# Patient Record
Sex: FEMALE | Race: WHITE | NOT HISPANIC OR LATINO | URBAN - METROPOLITAN AREA
[De-identification: names, ages, dates, MRNs, and addresses within clinical notes are randomized per-mention and may not be internally consistent; named-entity substitution may affect disease eponyms.]

---

## 2024-02-07 ENCOUNTER — INITIAL PRENATAL (OUTPATIENT)
Dept: OBSTETRICS AND GYNECOLOGY | Facility: CLINIC | Age: 34
End: 2024-02-07
Payer: COMMERCIAL

## 2024-02-07 VITALS — BODY MASS INDEX: 25.23 KG/M2 | WEIGHT: 147 LBS | SYSTOLIC BLOOD PRESSURE: 118 MMHG | DIASTOLIC BLOOD PRESSURE: 60 MMHG

## 2024-02-07 DIAGNOSIS — Z34.81 NORMAL PREGNANCY IN MULTIGRAVIDA IN FIRST TRIMESTER (HHS-HCC): ICD-10-CM

## 2024-02-07 DIAGNOSIS — Z98.891 HISTORY OF PRIMARY CESAREAN SECTION: ICD-10-CM

## 2024-02-07 DIAGNOSIS — Z34.91 FIRST TRIMESTER PREGNANCY (HHS-HCC): Primary | ICD-10-CM

## 2024-02-07 LAB — PREGNANCY TEST URINE, POC: POSITIVE

## 2024-02-07 PROCEDURE — 87086 URINE CULTURE/COLONY COUNT: CPT

## 2024-02-07 PROCEDURE — 87800 DETECT AGNT MULT DNA DIREC: CPT

## 2024-02-07 PROCEDURE — 81025 URINE PREGNANCY TEST: CPT | Performed by: ADVANCED PRACTICE MIDWIFE

## 2024-02-07 PROCEDURE — 87661 TRICHOMONAS VAGINALIS AMPLIF: CPT

## 2024-02-07 PROCEDURE — 0500F INITIAL PRENATAL CARE VISIT: CPT | Performed by: ADVANCED PRACTICE MIDWIFE

## 2024-02-07 ASSESSMENT — ENCOUNTER SYMPTOMS
MUSCULOSKELETAL NEGATIVE: 0
PSYCHIATRIC NEGATIVE: 0
RESPIRATORY NEGATIVE: 0
NEUROLOGICAL NEGATIVE: 0
EYES NEGATIVE: 0
CARDIOVASCULAR NEGATIVE: 0
ABDOMINAL PAIN: 1
ALLERGIC/IMMUNOLOGIC NEGATIVE: 0
UNEXPECTED WEIGHT CHANGE: 1
CONSTITUTIONAL NEGATIVE: 1
NAUSEA: 1
HEMATOLOGIC/LYMPHATIC NEGATIVE: 0
FATIGUE: 1
GASTROINTESTINAL NEGATIVE: 1
ENDOCRINE NEGATIVE: 0

## 2024-02-07 ASSESSMENT — EDINBURGH POSTNATAL DEPRESSION SCALE (EPDS)
I HAVE BLAMED MYSELF UNNECESSARILY WHEN THINGS WENT WRONG: NO, NEVER
I HAVE BEEN SO UNHAPPY THAT I HAVE BEEN CRYING: NO, NEVER
I HAVE FELT SCARED OR PANICKY FOR NO GOOD REASON: NO, NOT AT ALL
THE THOUGHT OF HARMING MYSELF HAS OCCURRED TO ME: NEVER
I HAVE LOOKED FORWARD WITH ENJOYMENT TO THINGS: AS MUCH AS I EVER DID
I HAVE BEEN ANXIOUS OR WORRIED FOR NO GOOD REASON: YES, SOMETIMES
I HAVE BEEN ABLE TO LAUGH AND SEE THE FUNNY SIDE OF THINGS: AS MUCH AS I ALWAYS COULD
TOTAL SCORE: 3
I HAVE FELT SAD OR MISERABLE: NO, NOT AT ALL
I HAVE BEEN SO UNHAPPY THAT I HAVE HAD DIFFICULTY SLEEPING: NOT AT ALL
THINGS HAVE BEEN GETTING ON TOP OF ME: NO, MOST OF THE TIME I HAVE COPED QUITE WELL

## 2024-02-07 NOTE — PROGRESS NOTES
"Patient presents for NOB visit with alone.     This was an planned pregnancy, and patient is feeling \"great\"  Complaints: Poss stomach ache    Work: Administrative work -remote   Feels safe at home: Yes    Review of Systems   Constitutional:  Positive for fatigue and unexpected weight change.   Gastrointestinal:  Positive for abdominal pain and nausea.   Genitourinary:  Positive for vaginal discharge.   All other systems reviewed and are negative.      1. Routine PNC, patient appropriate for midwifery service. Patient oriented to practice, including available collaboration and consulting with physicians.   Prenatal navigator findings reviewed.  Discussed and ordered routine OB labs including serum STI/HIV, CBC and blood type and screen.   Pap  MILN/HPV neg Denies hx abnormal  LMP: 23.  Education provided r/t nutrition, folic acid supplementation, dietary guidelines, exercise, smoking, alcohol, caffeine and drug use. Healthy weight gain in pregnancy discussed.  Body mass index is 25.23 kg/m².    2. Pregnancy Hx  Reviewed and significant for: Hx  due to arrest of descent desires TOLAC MFMU score 54.4%    3. Genetic screening:  Genetic carrier screening discussed/offered and patient declined. Reports completed.  Chromosomal anomaly screening discussed/offered and patient declined.   Included in counseling that only diagnostic testing is CVS or amniocentesis.    4. Preeclampsia risk:  ASA prophylaxis: not candidate    6. Vaccination in early pregnancy:  Recommendation for Influenza and COVID vaccine discussed. Patient aware of increased risk of disease and demonstrated safety of vaccination during pregnancy.  Pt declined flu vaccine.  Pt accepted COVID vaccine, no boosters and does not plan.    7. Medical History Significant for:  negative    Warning s/s discussed and SAB precautions reviewed.   RTC 4wks/prn    Next visit review ultrasound and labs. Physical exam (no PAP).  "

## 2024-02-08 ENCOUNTER — HOSPITAL ENCOUNTER (OUTPATIENT)
Dept: RADIOLOGY | Facility: CLINIC | Age: 34
Discharge: HOME | End: 2024-02-08
Payer: COMMERCIAL

## 2024-02-08 DIAGNOSIS — Z34.91 FIRST TRIMESTER PREGNANCY (HHS-HCC): ICD-10-CM

## 2024-02-08 LAB
BACTERIA UR CULT: NO GROWTH
C TRACH RRNA SPEC QL NAA+PROBE: NEGATIVE
N GONORRHOEA DNA SPEC QL PROBE+SIG AMP: NEGATIVE
T VAGINALIS RRNA SPEC QL NAA+PROBE: NEGATIVE

## 2024-02-08 PROCEDURE — 76801 OB US < 14 WKS SINGLE FETUS: CPT

## 2024-02-08 PROCEDURE — 76801 OB US < 14 WKS SINGLE FETUS: CPT | Performed by: OBSTETRICS & GYNECOLOGY

## 2024-02-21 ENCOUNTER — HOSPITAL ENCOUNTER (OUTPATIENT)
Dept: RADIOLOGY | Facility: CLINIC | Age: 34
Discharge: HOME | End: 2024-02-21
Payer: COMMERCIAL

## 2024-02-21 DIAGNOSIS — O20.9 VAGINAL BLEEDING AFFECTING EARLY PREGNANCY (HHS-HCC): Primary | ICD-10-CM

## 2024-02-21 DIAGNOSIS — Z34.91 FIRST TRIMESTER PREGNANCY (HHS-HCC): ICD-10-CM

## 2024-02-21 DIAGNOSIS — O26.851 SPOTTING COMPLICATING PREGNANCY, FIRST TRIMESTER (HHS-HCC): ICD-10-CM

## 2024-02-21 PROCEDURE — 76816 OB US FOLLOW-UP PER FETUS: CPT

## 2024-02-21 PROCEDURE — 76816 OB US FOLLOW-UP PER FETUS: CPT | Performed by: OBSTETRICS & GYNECOLOGY

## 2024-02-29 ENCOUNTER — TELEPHONE (OUTPATIENT)
Dept: OBSTETRICS AND GYNECOLOGY | Facility: CLINIC | Age: 34
End: 2024-02-29
Payer: COMMERCIAL

## 2024-02-29 NOTE — TELEPHONE ENCOUNTER
Patient called in wanting to know what the codes they will be charged for are for their upcoming visit on 3/6. Please advise.

## 2024-03-01 NOTE — TELEPHONE ENCOUNTER
Patient coming in on 3/6/24.   Would like to know what billing code will be used to charge her visit?    Sydnee Miles MA

## 2024-03-06 ENCOUNTER — ROUTINE PRENATAL (OUTPATIENT)
Dept: OBSTETRICS AND GYNECOLOGY | Facility: CLINIC | Age: 34
End: 2024-03-06
Payer: COMMERCIAL

## 2024-03-06 ENCOUNTER — TELEPHONE (OUTPATIENT)
Dept: OBSTETRICS AND GYNECOLOGY | Facility: CLINIC | Age: 34
End: 2024-03-06

## 2024-03-06 VITALS — BODY MASS INDEX: 25.47 KG/M2 | DIASTOLIC BLOOD PRESSURE: 60 MMHG | SYSTOLIC BLOOD PRESSURE: 100 MMHG | WEIGHT: 148.4 LBS

## 2024-03-06 DIAGNOSIS — Z34.82 NORMAL PREGNANCY IN MULTIGRAVIDA IN SECOND TRIMESTER (HHS-HCC): ICD-10-CM

## 2024-03-06 DIAGNOSIS — Z3A.14 14 WEEKS GESTATION OF PREGNANCY (HHS-HCC): Primary | ICD-10-CM

## 2024-03-06 PROCEDURE — 0501F PRENATAL FLOW SHEET: CPT | Performed by: ADVANCED PRACTICE MIDWIFE

## 2024-03-06 NOTE — PROGRESS NOTES
Return OB visit    S: Talia Talavera is a 34 y.o.  at 14w0d with a working estimated date of delivery of 2024, by Last Menstrual Period who presents for a routine prenatal visit. She denies vaginal bleeding, abdominal pain, leakage of fluid.     Concerns today: seen in ER for bleeding, resolved    O: See prenatal flow sheet    A/P:  Dating by LMP c/w 10w ultrasound  Physical completed today  Reviewed warning signs and when to call midwife  Follow up in 4 weeks for a routine prenatal visit--consult with physician for TOLAC

## 2024-03-06 NOTE — TELEPHONE ENCOUNTER
Patient wanted to know if they could be sent the codes from their lab work that was requested. Patient also was wondering if they could get the codes for their anatomy scan as well if possible.

## 2024-03-08 NOTE — TELEPHONE ENCOUNTER
Spoke to patient   Patient driving   Informed will send Codes on incuBET   Message sent  Patient will call back if anything else is needed.    Sydnee Miles MA

## 2024-03-13 DIAGNOSIS — Z34.82 NORMAL PREGNANCY IN MULTIGRAVIDA IN SECOND TRIMESTER (HHS-HCC): Primary | ICD-10-CM

## 2024-04-01 LAB
EXTERNAL ABO GROUPING: NORMAL
EXTERNAL ANTIBODY SCREEN: NORMAL
EXTERNAL HEPATITIS B SURFACE ANTIGEN: NEGATIVE
EXTERNAL RH FACTOR: POSITIVE
EXTERNAL RUBELLA IGG QUANTITATION: NOT DETECTED
HEPATITIS C VIRUS AB PRESENCE IN SERUM EXTERNAL: NONREACTIVE
HIV 1/ 2 AG/AB SCREEN EXTERNAL: NON REACTIVE
SYPHILIS TOTAL AB EXTERNAL: NON REACTIVE

## 2024-04-09 ENCOUNTER — APPOINTMENT (OUTPATIENT)
Dept: RADIOLOGY | Facility: CLINIC | Age: 34
End: 2024-04-09
Payer: COMMERCIAL

## 2024-04-09 ENCOUNTER — APPOINTMENT (OUTPATIENT)
Dept: OBSTETRICS AND GYNECOLOGY | Facility: CLINIC | Age: 34
End: 2024-04-09
Payer: COMMERCIAL

## 2024-04-09 ENCOUNTER — TELEPHONE (OUTPATIENT)
Dept: OBSTETRICS AND GYNECOLOGY | Facility: CLINIC | Age: 34
End: 2024-04-09

## 2024-04-09 ENCOUNTER — HOSPITAL ENCOUNTER (OUTPATIENT)
Dept: RADIOLOGY | Facility: CLINIC | Age: 34
Discharge: HOME | End: 2024-04-09
Payer: COMMERCIAL

## 2024-04-09 ENCOUNTER — ROUTINE PRENATAL (OUTPATIENT)
Dept: OBSTETRICS AND GYNECOLOGY | Facility: CLINIC | Age: 34
End: 2024-04-09
Payer: COMMERCIAL

## 2024-04-09 VITALS
SYSTOLIC BLOOD PRESSURE: 112 MMHG | BODY MASS INDEX: 26.16 KG/M2 | WEIGHT: 152.38 LBS | DIASTOLIC BLOOD PRESSURE: 72 MMHG

## 2024-04-09 DIAGNOSIS — Z3A.18 18 WEEKS GESTATION OF PREGNANCY (HHS-HCC): Primary | ICD-10-CM

## 2024-04-09 DIAGNOSIS — Z34.81 NORMAL PREGNANCY IN MULTIGRAVIDA IN FIRST TRIMESTER (HHS-HCC): ICD-10-CM

## 2024-04-09 DIAGNOSIS — O36.8390 FETAL ARRHYTHMIA AFFECTING PREGNANCY, ANTEPARTUM (HHS-HCC): ICD-10-CM

## 2024-04-09 PROCEDURE — 76811 OB US DETAILED SNGL FETUS: CPT | Performed by: OBSTETRICS & GYNECOLOGY

## 2024-04-09 PROCEDURE — 0501F PRENATAL FLOW SHEET: CPT | Performed by: OBSTETRICS & GYNECOLOGY

## 2024-04-09 PROCEDURE — 76811 OB US DETAILED SNGL FETUS: CPT

## 2024-04-09 NOTE — PROGRESS NOTES
Physician visit for TOLAC counseling.  Patient had a  section in [place] 2022 for arrest of descent, baby was 7 lb 13 oz.  Labored spontaneously at 39+ wks, baby was OP, two physicians tried to rotate it.  Pushed for 6 hours, then turned into an urgent section, baby spent 4 days in NICU (not intubated). MFMU score 54.4%.  We do not have her operative report in her chart, a release was signed today.  The patient strongly desires a TOLAC.  We discussed that she has a recurring indication for a  section and that I would recommend a repeat  section given arrest of descent and time pushing.  She again indicated a strong desire for TOLAC, states she would not push for that long again.  We did not sign TOLAC consent form today.  Plan to get her operative report and see me back in her early third trimester to re-discuss and sign consent and readdress delivery plan.  On FHTs today, there is a clear fetal arrhythmia noted (?PACs).  Scheduled for add on MFM ultrasound today at 3 pm for this.

## 2024-04-10 ENCOUNTER — TELEPHONE (OUTPATIENT)
Dept: OBSTETRICS AND GYNECOLOGY | Facility: HOSPITAL | Age: 34
End: 2024-04-10
Payer: COMMERCIAL

## 2024-04-15 ENCOUNTER — HOSPITAL ENCOUNTER (OUTPATIENT)
Dept: RADIOLOGY | Facility: CLINIC | Age: 34
Discharge: HOME | End: 2024-04-15
Payer: COMMERCIAL

## 2024-04-15 DIAGNOSIS — Z34.91 FIRST TRIMESTER PREGNANCY (HHS-HCC): ICD-10-CM

## 2024-04-15 DIAGNOSIS — O36.8390 FETAL ARRHYTHMIA AFFECTING PREGNANCY, ANTEPARTUM (HHS-HCC): ICD-10-CM

## 2024-04-15 DIAGNOSIS — O35.BXX0 ABNORMAL FETAL ECHOCARDIOGRAPHY AFFECTING ANTEPARTUM CARE OF MOTHER, SINGLE OR UNSPECIFIED FETUS (HHS-HCC): Primary | ICD-10-CM

## 2024-04-15 PROCEDURE — 76815 OB US LIMITED FETUS(S): CPT | Performed by: OBSTETRICS & GYNECOLOGY

## 2024-04-15 PROCEDURE — 76815 OB US LIMITED FETUS(S): CPT

## 2024-04-16 ENCOUNTER — HOSPITAL ENCOUNTER (OUTPATIENT)
Dept: PEDIATRIC CARDIOLOGY | Facility: HOSPITAL | Age: 34
Discharge: HOME | End: 2024-04-16
Payer: COMMERCIAL

## 2024-04-16 ENCOUNTER — OFFICE VISIT (OUTPATIENT)
Dept: PEDIATRIC CARDIOLOGY | Facility: HOSPITAL | Age: 34
End: 2024-04-16
Payer: COMMERCIAL

## 2024-04-16 VITALS
DIASTOLIC BLOOD PRESSURE: 64 MMHG | SYSTOLIC BLOOD PRESSURE: 110 MMHG | BODY MASS INDEX: 26.38 KG/M2 | HEART RATE: 99 BPM | HEIGHT: 64 IN | WEIGHT: 154.54 LBS

## 2024-04-16 DIAGNOSIS — O35.BXX0 ABNORMAL FETAL ECHOCARDIOGRAPHY AFFECTING ANTEPARTUM CARE OF MOTHER, SINGLE OR UNSPECIFIED FETUS (HHS-HCC): ICD-10-CM

## 2024-04-16 DIAGNOSIS — O36.8390 FETAL ARRHYTHMIA AFFECTING PREGNANCY, ANTEPARTUM (HHS-HCC): Primary | ICD-10-CM

## 2024-04-16 DIAGNOSIS — O35.8XX0 MATERNAL CARE FOR OTHER (SUSPECTED) FETAL ABNORMALITY AND DAMAGE, NOT APPLICABLE OR UNSPECIFIED (HHS-HCC): ICD-10-CM

## 2024-04-16 PROCEDURE — 99205 OFFICE O/P NEW HI 60 MIN: CPT | Performed by: PEDIATRICS

## 2024-04-16 PROCEDURE — 99215 OFFICE O/P EST HI 40 MIN: CPT | Performed by: PEDIATRICS

## 2024-04-16 PROCEDURE — 93325 DOPPLER ECHO COLOR FLOW MAPG: CPT

## 2024-04-16 PROCEDURE — 76827 ECHO EXAM OF FETAL HEART: CPT | Performed by: PEDIATRICS

## 2024-04-16 PROCEDURE — 93325 DOPPLER ECHO COLOR FLOW MAPG: CPT | Performed by: PEDIATRICS

## 2024-04-16 NOTE — PROGRESS NOTES
"Talia Talavera was seen at the request of Lester Duncan for a chief complaint of persistent PAC's; a report with my findings is being sent via written or electronic means the referring physician with my recommendations for treatment.     I had the pleasure of seeing Talia Talavera in Pediatric Cardiology consultation at our [facility] location as part of our prenatal heart program for PAC's that were first found on obstetric ultrasound on 24. Ms. Talavera had a follow up ultrasound on 4/15/24 that showed persistent PAC's and \"FHR had a few beats that were less than 110.\"  She is a 34 y.o. year-old  woman, currently 19w6d weeks gestation. Patient's last menstrual period was 2023..  Estimated Date of Delivery: 24.  There have been no pregnancy complications.   She has not been hospitalized during this pregnancy.  She declined aneuploidy testing.  She had a second trimester ultrasound which showed PAC's and brief episodes of FHR <110..      Prior to the visit, I personally reviewed the cardiac portions of the obstetrical ultrasound performed on 4/15/24.  There is normal segmental anatomy with normal 4 chamber, outflow tract and 3 vessel views.  There is no evidence of septation defect, right or left ventricular outflow obstruction or significant valvular regurgitation.  There were multiple episodes of ectopy with premature atrial contractions.  Some 2D images are suggestive of couplets.    Her previous obstetrical history is significant for 1 full term delivery.  Her past medical history is without complication.  She has no history of congenital heart disease, arrhythmia, cardiomyopathy, hypercholesterolemia, hypertension, diabetes, rheumatic heart disease, cancer, asthma, lupus, Sjogren syndrome, clotting disorder, depression, anxiety, alcohol abuse, phenylketonuria, or DiGeorge.  She has had a .  She takes vitamin D.  She has No Known Allergies..  She is currently taking prenatal " "vitamins.      Her family history is negative for congenital heart disease, early atherosclerosis, sudden cardiac death, long QT syndrome, cardiomyopathy, aortic aneurysm, or genetic or metabolic disease.      She currently lives with her spouse and is .  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  She denies illicit drug use or alcohol abuse.  She denies verbal, sexual, or physical abuse.     Delivery Hospital: [facility]  Father of the baby's name: Rex    /64 (BP Location: Right arm, Patient Position: Sitting, BP Cuff Size: Adult)   Pulse 99   Ht 1.614 m (5' 3.54\")   Wt 70.1 kg (154 lb 8.7 oz)   LMP 11/29/2023   BMI 26.91 kg/m²     She was resting comfortably in the examination room and alert, active and in no respiratory distress. Skin was without rash.  HEENT: moist mucous membranes, no JVD, goiter. Breathing is not labored.  She was acyanotic.  There was no peripheral edema.   The abdomen was gravid, soft, nontender with normal bowel sounds.  The liver was not palpable.  The spleen tip was not palpable.  She had a normal gait and normal strength in all extremities.  Cranial nerves II - XII are intact.  She had no clubbing, cyanosis, or edema.    A two-dimensional and Doppler fetal echocardiogram was performed today and interpreted by me at 19w6d weeks gestation.  The fetal echocardiogram showed normal segmental anatomy with no structural abnormalities found.  There is normal cardiac function.  There is no evidence of septation defect, right or left ventricular outflow obstruction or significant valvular regurgitation.  The fetal heart rate was within normal limits with frequent premature atrial contractions, two couplets and one possible triplet.  The spectral Doppler pattern across all valves, venous structures, and arterial structures was within normal limits.  There is no pericardial effusion.  Please see full report for details.    In summary, Talia Talavera is a 34 " y.o. year-old  woman, currently 19w6d weeks gestation, who had a fetal echocardiogram that demonstrated frequent premature atrial contractions (PACs), two atrial couplets and one possible triplet.  Today's echocardiogram was reassuring in that there were no episodes of sustained ectopy or tachycardia; no sustained bradycardia. There was no evidence of ventricular or atrial dilatation and no effusions noted. Premature atrial beats are common findings (1-3% of pregnancies) and generally well tolerated unless sustained tachycardia develops. No intervention recommended at this time. Risk of fetal tachycardia is about 0.5% to 1%, although couplets and blocked atrial bigeminy may increase this risk. Because of the couplets, I did recommend a follow up scan in 1 week.  Medical treatment is not recommended for either premature atrial contractions or blocked atrial bigeminy; however, interval auscultation of the fetal heart rate by the obstetrician weekly is recommended for premature ventricular contractions or frequent premature atrial contractions until resolution of the arrhythmia is documented.   If ectopy noted after delivery, recommend ECG on the .  At this time, we did not make any changes to delivery plan.      LOC 1  This cardiac anomaly is not expected to cause hemodynamic instability in the  period. No changes were made to current delivery plan.  Triage code 1: Delivery per OB at patient's preferred hospital.  Standard  care per  team.  Cardiology evaluation prior to discharge if born at [facility] or as an outpatient within 1-2 weeks if born at an outside facility.      Thank you for allowing me to participate in Talia's care.  If you have any further questions, please do not hesitate to contact me.     Baron Abreu M.D.  [facility], Director  Ambulatory Pediatric Cardiology   Division of Pediatric Cardiology  [facility]  [facility]   of Pediatrics, [facility] [employer]  [facility] -   [address]  [place] [id number]  Office:  [phone]  Fax:       [phone]  e-mail:  [email or web address]    I spent greater than 60 minutes in performance of this consultation, of which greater than 50% was related to coordination of care or counseling.

## 2024-04-16 NOTE — LETTER
"2024     Lester Duncan MD  [address]  Ob/Gyn  [facility] [address]    Patient: Talia Talavera   YOB: 1990   Date of Visit: 2024       Dear Dr. Lester Duncan MD:    Thank you for referring Talia Talavera to me for evaluation. Below are my notes for this consultation.  If you have questions, please do not hesitate to call me. I look forward to following your patient along with you.       Sincerely,     Baron Abreu MD      CC: MD Amy Umanzor, APRN-CN  Alesia Khan, APRN-CN, RASHID Barbosa, APRN-CNP  ______________________________________________________________________________________    Talia Talavera was seen at the request of Lester Duncan for a chief complaint of persistent PAC's; a report with my findings is being sent via written or electronic means the referring physician with my recommendations for treatment.     I had the pleasure of seeing Talia Talavera in Pediatric Cardiology consultation at our [facility] location as part of our prenatal heart program for PAC's that were first found on obstetric ultrasound on 24. Ms. Talavera had a follow up ultrasound on 4/15/24 that showed persistent PAC's and \"FHR had a few beats that were less than 110.\"  She is a 34 y.o. year-old  woman, currently 19w6d weeks gestation. Patient's last menstrual period was 2023..  Estimated Date of Delivery: 24.  There have been no pregnancy complications.   She has not been hospitalized during this pregnancy.  She declined aneuploidy testing.  She had a second trimester ultrasound which showed PAC's and brief episodes of FHR <110..      Prior to the visit, I personally reviewed the cardiac portions of the obstetrical ultrasound performed on 4/15/24.  There is normal segmental anatomy with normal 4 chamber, outflow tract and 3 vessel views.  There is no evidence of septation defect, right or left ventricular outflow obstruction or " "significant valvular regurgitation.  There were multiple episodes of ectopy with premature atrial contractions.  Some 2D images are suggestive of couplets.    Her previous obstetrical history is significant for 1 full term delivery.  Her past medical history is without complication.  She has no history of congenital heart disease, arrhythmia, cardiomyopathy, hypercholesterolemia, hypertension, diabetes, rheumatic heart disease, cancer, asthma, lupus, Sjogren syndrome, clotting disorder, depression, anxiety, alcohol abuse, phenylketonuria, or DiGeorge.  She has had a .  She takes vitamin D.  She has No Known Allergies..  She is currently taking prenatal vitamins.      Her family history is negative for congenital heart disease, early atherosclerosis, sudden cardiac death, long QT syndrome, cardiomyopathy, aortic aneurysm, or genetic or metabolic disease.      She currently lives with her spouse and is .  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  She denies illicit drug use or alcohol abuse.  She denies verbal, sexual, or physical abuse.     Delivery Hospital: [facility]  Father of the baby's name: Rex    /64 (BP Location: Right arm, Patient Position: Sitting, BP Cuff Size: Adult)   Pulse 99   Ht 1.614 m (5' 3.54\")   Wt 70.1 kg (154 lb 8.7 oz)   LMP 2023   BMI 26.91 kg/m²     She was resting comfortably in the examination room and alert, active and in no respiratory distress. Skin was without rash.  HEENT: moist mucous membranes, no JVD, goiter. Breathing is not labored.  She was acyanotic.  There was no peripheral edema.   The abdomen was gravid, soft, nontender with normal bowel sounds.  The liver was not palpable.  The spleen tip was not palpable.  She had a normal gait and normal strength in all extremities.  Cranial nerves II - XII are intact.  She had no clubbing, cyanosis, or edema.    A two-dimensional and Doppler fetal echocardiogram was performed today " and interpreted by me at 19w6d weeks gestation.  The fetal echocardiogram showed normal segmental anatomy with no structural abnormalities found.  There is normal cardiac function.  There is no evidence of septation defect, right or left ventricular outflow obstruction or significant valvular regurgitation.  The fetal heart rate was within normal limits with frequent premature atrial contractions, two couplets and one possible triplet.  The spectral Doppler pattern across all valves, venous structures, and arterial structures was within normal limits.  There is no pericardial effusion.  Please see full report for details.    In summary, Talia Talavera is a 34 y.o. year-old  woman, currently 19w6d weeks gestation, who had a fetal echocardiogram that demonstrated frequent premature atrial contractions (PACs), two atrial couplets and one possible triplet.  Today's echocardiogram was reassuring in that there were no episodes of sustained ectopy or tachycardia; no sustained bradycardia. There was no evidence of ventricular or atrial dilatation and no effusions noted. Premature atrial beats are common findings (1-3% of pregnancies) and generally well tolerated unless sustained tachycardia develops. No intervention recommended at this time. Risk of fetal tachycardia is about 0.5% to 1%, although couplets and blocked atrial bigeminy may increase this risk. Because of the couplets, I did recommend a follow up scan in 1 week.  Medical treatment is not recommended for either premature atrial contractions or blocked atrial bigeminy; however, interval auscultation of the fetal heart rate by the obstetrician weekly is recommended for premature ventricular contractions or frequent premature atrial contractions until resolution of the arrhythmia is documented.   If ectopy noted after delivery, recommend ECG on the .  At this time, we did not make any changes to delivery plan.      LOC 1  This cardiac anomaly is not  expected to cause hemodynamic instability in the  period. No changes were made to current delivery plan.  Triage code 1: Delivery per OB at patient's preferred hospital.  Standard  care per  team.  Cardiology evaluation prior to discharge if born at [facility] or as an outpatient within 1-2 weeks if born at an outside facility.      Thank you for allowing me to participate in Talia's care.  If you have any further questions, please do not hesitate to contact me.     Baron Abreu M.D.  [facility], Director  Ambulatory Pediatric Cardiology   Division of Pediatric Cardiology  [facility]  [facility]   of Pediatrics, [facility] [employer]  [facility] -   [address]  [place] [address]  Office:  [phone]  Fax:       [phone]  e-mail:  [email or web address]    I spent greater than 60 minutes in performance of this consultation, of which greater than 50% was related to coordination of care or counseling.

## 2024-04-16 NOTE — PATIENT INSTRUCTIONS
Your fetus has skipped heart beats.  The mostly consist of premature atrial contractions, or PACs for short.  They are common and happen in almost 1-3% of pregnancies.  The fetus can tolerate this and is not in any danger.  We do not treat fetal PACs.  The fetus is showing very brief episodes of tachycardia, too fast of a heart beat.  They are short and only last 2-4 hear beats.  The heart can tolerate this quite well.  I would like to see you in 1 week to re-check the fetal heart rate.  At this point, we don't need to start any treatments or change your delivery plan.    If your pediatrician hears PACs after delivery, they can order an ECG and schedule a referral with a pediatric cardiologist.      Sometimes it is not possible to see all the heart structures because of the position or size of the fetus. This does not mean they are not there, but may mean that for technical reasons they cannot be assessed. Sometimes this information may not be important; while in some cases it means that definite answers are not possible. The echocardiographer will discuss this with you if necessary, and repeat studies are frequently performed later in the pregnancy.     Certain congenital heart abnormalities are also hard to detect by fetal echocardiograms, but often these are simple abnormalities. We do not mention this to concern you, but rather that you understand that there are technical limitations to such studies. It remains important that your pediatrician provides a normal careful medical examination of the baby (including the heart) takes place after birth, and if there were any suspicious cardiac findings, that these are evaluated in the usual way irrespective of the findings at fetal study.     Certain communications between the two sides of the circulation are normally present in all developing babies and normally close after birth. We are not able to tell in advance whether this will occur, however there is only a  tiny chance that they will not. Persistence of these structures is generally not a difficult problem to deal with.     We direct our attention only to the heart, where we have special expertise. This is not the same as your general obstetric ultrasound scan. Other ultrasound information about the fetus can be obtained from an obstetric ultrasonographer or your obstetrician.

## 2024-04-25 ENCOUNTER — ANCILLARY PROCEDURE (OUTPATIENT)
Dept: PEDIATRIC CARDIOLOGY | Facility: CLINIC | Age: 34
End: 2024-04-25
Payer: COMMERCIAL

## 2024-04-25 ENCOUNTER — OFFICE VISIT (OUTPATIENT)
Dept: PEDIATRIC CARDIOLOGY | Facility: CLINIC | Age: 34
End: 2024-04-25
Payer: COMMERCIAL

## 2024-04-25 VITALS
BODY MASS INDEX: 26.8 KG/M2 | WEIGHT: 156.97 LBS | HEART RATE: 79 BPM | HEIGHT: 64 IN | DIASTOLIC BLOOD PRESSURE: 72 MMHG | SYSTOLIC BLOOD PRESSURE: 104 MMHG

## 2024-04-25 DIAGNOSIS — O35.9XX0 SUSPECTED FETAL ABNORMALITY AFFECTING MANAGEMENT OF MOTHER, SINGLE OR UNSPECIFIED FETUS (HHS-HCC): ICD-10-CM

## 2024-04-25 DIAGNOSIS — O35.8XX0 MATERNAL CARE FOR OTHER (SUSPECTED) FETAL ABNORMALITY AND DAMAGE, NOT APPLICABLE OR UNSPECIFIED (HHS-HCC): ICD-10-CM

## 2024-04-25 DIAGNOSIS — O36.8390 FETAL ARRHYTHMIA AFFECTING PREGNANCY, ANTEPARTUM (HHS-HCC): Primary | ICD-10-CM

## 2024-04-25 PROCEDURE — 93325 DOPPLER ECHO COLOR FLOW MAPG: CPT | Performed by: PEDIATRICS

## 2024-04-25 PROCEDURE — 76827 ECHO EXAM OF FETAL HEART: CPT | Performed by: PEDIATRICS

## 2024-04-25 PROCEDURE — 99215 OFFICE O/P EST HI 40 MIN: CPT | Performed by: PEDIATRICS

## 2024-04-25 NOTE — PROGRESS NOTES
"Talia Talavera was seen at the request of Lester Duncan for a chief complaint of persistent PAC's; a report with my findings is being sent via written or electronic means the referring physician with my recommendations for treatment.     I had the pleasure of seeing Talia Talavera in Pediatric Cardiology consultation at our [facility] location as part of our prenatal heart program for follow up of a fetal echocardiogram on 4/15/24 that showed frequent PAC's with the majority being conducted and at least 2 atrial couplets. She was originally referred for PAC's that were first found on obstetric ultrasound on 24. Ms. Talavera had a follow up ultrasound on 4/15/24 that showed persistent PAC's and \"FHR had a few beats that were less than 110.\"  She is a 34 y.o. year-old  woman, currently 21w1d weeks gestation. Patient's last menstrual period was 2023.  Estimated Date of Delivery: 24.  There have been no pregnancy complications.   She has not been hospitalized during this pregnancy.  She declined aneuploidy testing.  She had a second trimester ultrasound which showed PAC's and brief episodes of FHR <110..      Prior to the visit, I personally reviewed the cardiac portions of the obstetrical ultrasound performed on 4/15/24.  There is normal segmental anatomy with normal 4 chamber, outflow tract and 3 vessel views.  There is no evidence of septation defect, right or left ventricular outflow obstruction or significant valvular regurgitation.  There were multiple episodes of ectopy with premature atrial contractions.  Some 2D images are suggestive of couplets.    Her previous obstetrical history is significant for 1 full term delivery.  Her past medical history is without complication.  She has no history of congenital heart disease, arrhythmia, cardiomyopathy, hypercholesterolemia, hypertension, diabetes, rheumatic heart disease, cancer, asthma, lupus, Sjogren syndrome, clotting disorder, " "depression, anxiety, alcohol abuse, phenylketonuria, or DiGeorge.  She has had a .  She takes vitamin D.  She has No Known Allergies.  She is currently taking prenatal vitamins.      Her family history is negative for congenital heart disease, early atherosclerosis, sudden cardiac death, long QT syndrome, cardiomyopathy, aortic aneurysm, or genetic or metabolic disease.      She currently lives with her spouse and daughter and is .  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  She denies illicit drug use or alcohol abuse.  She denies verbal, sexual, or physical abuse.     [place] [facility] [place]  Father of the baby's name: Rex    /72 (BP Location: Right arm, Patient Position: Sitting)   Pulse 79   Ht 1.624 m (5' 3.94\")   Wt 71.2 kg (156 lb 15.5 oz)   LMP 2023   BMI 27.00 kg/m²     She was resting comfortably in the examination room and alert, active and in no respiratory distress. Skin was without rash.  HEENT: moist mucous membranes, no JVD, goiter. Breathing is not labored.  She was acyanotic.  There was no peripheral edema.   The abdomen was gravid, soft, nontender with normal bowel sounds.  The liver was not palpable.  The spleen tip was not palpable.  She had a normal gait and normal strength in all extremities.  Cranial nerves II - XII are intact.  She had no clubbing, cyanosis, or edema.    A two-dimensional and Doppler fetal echocardiogram was performed today and interpreted by me at 21w1d weeks gestation.  The fetal echocardiogram showed normal segmental anatomy with no structural abnormalities found.  There is normal cardiac function.  There is no evidence of septation defect, right or left ventricular outflow obstruction or significant valvular regurgitation.  The fetal heart rate was within normal limits with frequent premature atrial contractions, two couplets and one possible triplet.  The spectral Doppler pattern across all valves, venous " structures, and arterial structures was within normal limits.  There is no pericardial effusion.  Please see full report for details.    In summary, Talia Talavera is a 34 y.o. year-old  woman, currently 21w1d weeks gestation, who had a fetal echocardiogram that demonstrated frequent premature atrial contractions (PACs) with frequent bigeminy and trigeminy.  Her previous fetal echocardiogram demonstrated two atrial couplets and one possible triplet.  Today's echocardiogram was reassuring in that there were no episodes of sustained ectopy or tachycardia; no sustained bradycardia. There was no evidence of ventricular or atrial dilatation and no effusions noted. Premature atrial beats are common findings (1-3% of pregnancies) and generally well tolerated unless sustained tachycardia develops. No intervention recommended at this time. Risk of fetal tachycardia is about 0.5% to 1%, although couplets and blocked atrial bigeminy may increase this risk. Because of the previous couplets and frequent bigeminy, I did recommend a follow up scan in 2 weeks.  I also recommend Doppler heart tones in the office next week if possible.  Medical treatment is not recommended for either premature atrial contractions or blocked atrial bigeminy; however, interval auscultation of the fetal heart rate by the obstetrician weekly is recommended for premature ventricular contractions or frequent premature atrial contractions until resolution of the arrhythmia is documented.   If ectopy noted after delivery, recommend ECG on the .  At this time, we did not make any changes to delivery plan.      LOC 1  This cardiac anomaly is not expected to cause hemodynamic instability in the  period. No changes were made to current delivery plan.  Triage code 1: Delivery per OB at patient's preferred hospital.  Standard  care per  team.  Cardiology evaluation prior to discharge if [facility] at [facility] or  as an outpatient within 1-2 weeks if born at an outside facility.      Thank you for allowing me to participate in Talia's care.  If you have any further questions, please do not hesitate to contact me.     Baron Abreu M.D.  [facility]etal [facility] Center, Director  Ambulatory Pediatric Cardiology   Division of Pediatric Cardiology  [facility]  [facility]   of Pediatrics, [facility] [employer]  [facility] [address]  [place] [address]  Office:  [phone]  Fax:       [phone]  e-mail:  [email or web address]

## 2024-04-25 NOTE — PATIENT INSTRUCTIONS
Your fetus has skipped heart beats.  The mostly consist of premature atrial contractions, or PACs for short.  They are common and happen in almost 1-3% of pregnancies.  The fetus can tolerate this and is not in any danger.  We do not treat fetal PACs.  The fetus did NOT show any episodes of tachycardia, too fast of a heart beat.  Last time, we saw a few bursts that were short and only lasted 2-4 hear beats.  The heart can tolerate this quite well.  I would like you to see your midwife in 1 week to re-check the fetal heart rate.  I would like to see you in 2 weeks.  At this point, we don't need to start any treatments or change your delivery plan.    If your pediatrician hears PACs after delivery, they can order an ECG and schedule a referral with a pediatric cardiologist.      Sometimes it is not possible to see all the heart structures because of the position or size of the fetus. This does not mean they are not there, but may mean that for technical reasons they cannot be assessed. Sometimes this information may not be important; while in some cases it means that definite answers are not possible. The echocardiographer will discuss this with you if necessary, and repeat studies are frequently performed later in the pregnancy.     Certain congenital heart abnormalities are also hard to detect by fetal echocardiograms, but often these are simple abnormalities. We do not mention this to concern you, but rather that you understand that there are technical limitations to such studies. It remains important that your pediatrician provides a normal careful medical examination of the baby (including the heart) takes place after birth, and if there were any suspicious cardiac findings, that these are evaluated in the usual way irrespective of the findings at fetal study.     Certain communications between the two sides of the circulation are normally present in all developing babies and normally close after birth. We  are not able to tell in advance whether this will occur, however there is only a tiny chance that they will not. Persistence of these structures is generally not a difficult problem to deal with.     We direct our attention only to the heart, where we have special expertise. This is not the same as your general obstetric ultrasound scan. Other ultrasound information about the fetus can be obtained from an obstetric ultrasonographer or your obstetrician.

## 2024-04-25 NOTE — LETTER
"2024     Lester Duncan MD  [address]  Ob/Gyn  [facility] [address]    Patient: Talia Talavera   YOB: 1990   Date of Visit: 2024       Dear Dr. Lester Duncan MD:    Thank you for referring Talia Talavera to me for evaluation. Below are my notes for this consultation.  If you have questions, please do not hesitate to call me. I look forward to following your patient along with you.       Sincerely,     Baron Abreu MD      CC: MD Amy Umanzor, APRN-CN  Alesia Khan, APRN-CN, RASHID Barbosa, APRN-CNP  ______________________________________________________________________________________    Talia Talavera was seen at the request of Lester Duncan for a chief complaint of persistent PAC's; a report with my findings is being sent via written or electronic means the referring physician with my recommendations for treatment.     I had the pleasure of seeing Talia Talavera in Pediatric Cardiology consultation at our [facility] location as part of our prenatal heart program for follow up of a fetal echocardiogram on 4/15/24 that showed frequent PAC's with the majority being conducted and at least 2 atrial couplets. She was originally referred for PAC's that were first found on obstetric ultrasound on 24. Ms. Talavera had a follow up ultrasound on 4/15/24 that showed persistent PAC's and \"FHR had a few beats that were less than 110.\"  She is a 34 y.o. year-old  woman, currently 21w1d weeks gestation. Patient's last menstrual period was 2023.  Estimated Date of Delivery: 24.  There have been no pregnancy complications.   She has not been hospitalized during this pregnancy.  She declined aneuploidy testing.  She had a second trimester ultrasound which showed PAC's and brief episodes of FHR <110..      Prior to the visit, I personally reviewed the cardiac portions of the obstetrical ultrasound performed on 4/15/24.  There is " "normal segmental anatomy with normal 4 chamber, outflow tract and 3 vessel views.  There is no evidence of septation defect, right or left ventricular outflow obstruction or significant valvular regurgitation.  There were multiple episodes of ectopy with premature atrial contractions.  Some 2D images are suggestive of couplets.    Her previous obstetrical history is significant for 1 full term delivery.  Her past medical history is without complication.  She has no history of congenital heart disease, arrhythmia, cardiomyopathy, hypercholesterolemia, hypertension, diabetes, rheumatic heart disease, cancer, asthma, lupus, Sjogren syndrome, clotting disorder, depression, anxiety, alcohol abuse, phenylketonuria, or DiGeorge.  She has had a .  She takes vitamin D.  She has No Known Allergies.  She is currently taking prenatal vitamins.      Her family history is negative for congenital heart disease, early atherosclerosis, sudden cardiac death, long QT syndrome, cardiomyopathy, aortic aneurysm, or genetic or metabolic disease.      She currently lives with her spouse and daughter and is .  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  She denies illicit drug use or alcohol abuse.  She denies verbal, sexual, or physical abuse.     [place] [facility] [place]  Father of the baby's name: Rex    /72 (BP Location: Right arm, Patient Position: Sitting)   Pulse 79   Ht 1.624 m (5' 3.94\")   Wt 71.2 kg (156 lb 15.5 oz)   LMP 2023   BMI 27.00 kg/m²     She was resting comfortably in the examination room and alert, active and in no respiratory distress. Skin was without rash.  HEENT: moist mucous membranes, no JVD, goiter. Breathing is not labored.  She was acyanotic.  There was no peripheral edema.   The abdomen was gravid, soft, nontender with normal bowel sounds.  The liver was not palpable.  The spleen tip was not palpable.  She had a normal gait and normal strength in all " extremities.  Cranial nerves II - XII are intact.  She had no clubbing, cyanosis, or edema.    A two-dimensional and Doppler fetal echocardiogram was performed today and interpreted by me at 21w1d weeks gestation.  The fetal echocardiogram showed normal segmental anatomy with no structural abnormalities found.  There is normal cardiac function.  There is no evidence of septation defect, right or left ventricular outflow obstruction or significant valvular regurgitation.  The fetal heart rate was within normal limits with frequent premature atrial contractions, two couplets and one possible triplet.  The spectral Doppler pattern across all valves, venous structures, and arterial structures was within normal limits.  There is no pericardial effusion.  Please see full report for details.    In summary, Talia Talavera is a 34 y.o. year-old  woman, currently 21w1d weeks gestation, who had a fetal echocardiogram that demonstrated frequent premature atrial contractions (PACs) with frequent bigeminy and trigeminy.  Her previous fetal echocardiogram demonstrated two atrial couplets and one possible triplet.  Today's echocardiogram was reassuring in that there were no episodes of sustained ectopy or tachycardia; no sustained bradycardia. There was no evidence of ventricular or atrial dilatation and no effusions noted. Premature atrial beats are common findings (1-3% of pregnancies) and generally well tolerated unless sustained tachycardia develops. No intervention recommended at this time. Risk of fetal tachycardia is about 0.5% to 1%, although couplets and blocked atrial bigeminy may increase this risk. Because of the previous couplets and frequent bigeminy, I did recommend a follow up scan in 2 weeks.  I also recommend Doppler heart tones in the office next week if possible.  Medical treatment is not recommended for either premature atrial contractions or blocked atrial bigeminy; however, interval auscultation of  the fetal heart rate by the obstetrician weekly is recommended for premature ventricular contractions or frequent premature atrial contractions until resolution of the arrhythmia is documented.   If ectopy noted after delivery, recommend ECG on the .  At this time, we did not make any changes to delivery plan.      LOC 1  This cardiac anomaly is not expected to cause hemodynamic instability in the  period. No changes were made to current delivery plan.  Triage code 1: Delivery per OB at patient's preferred hospital.  Standard  care per  team.  Cardiology evaluation prior to discharge if born at [facility] or as an outpatient within 1-2 weeks if born at an outside facility.      Thank you for allowing me to participate in Talia's care.  If you have any further questions, please do not hesitate to contact me.     Baron Abreu M.D.  Fetal Heart Center, Director  Ambulatory Pediatric Cardiology   Division of Pediatric Cardiology  [facility]  [facility]   of Pediatrics, [facility] [employer]  [facility] -   [address]  [place] [id number]  Office:  [phone]  Fax:       [phone]  e-mail:  [email or web address]

## 2024-04-29 ENCOUNTER — APPOINTMENT (OUTPATIENT)
Dept: PEDIATRIC CARDIOLOGY | Facility: HOSPITAL | Age: 34
End: 2024-04-29
Payer: COMMERCIAL

## 2024-05-01 ENCOUNTER — ROUTINE PRENATAL (OUTPATIENT)
Dept: OBSTETRICS AND GYNECOLOGY | Facility: CLINIC | Age: 34
End: 2024-05-01
Payer: COMMERCIAL

## 2024-05-01 VITALS — SYSTOLIC BLOOD PRESSURE: 106 MMHG | DIASTOLIC BLOOD PRESSURE: 58 MMHG | BODY MASS INDEX: 27.48 KG/M2 | WEIGHT: 159.8 LBS

## 2024-05-01 DIAGNOSIS — Z3A.22 22 WEEKS GESTATION OF PREGNANCY (HHS-HCC): ICD-10-CM

## 2024-05-01 DIAGNOSIS — Z98.891 HISTORY OF PRIMARY CESAREAN SECTION: Primary | ICD-10-CM

## 2024-05-01 DIAGNOSIS — O36.8390 FETAL ARRHYTHMIA AFFECTING PREGNANCY, ANTEPARTUM (HHS-HCC): ICD-10-CM

## 2024-05-01 DIAGNOSIS — Z34.81 NORMAL PREGNANCY IN MULTIGRAVIDA IN FIRST TRIMESTER (HHS-HCC): ICD-10-CM

## 2024-05-01 PROCEDURE — 0501F PRENATAL FLOW SHEET: CPT | Performed by: ADVANCED PRACTICE MIDWIFE

## 2024-05-01 NOTE — PROGRESS NOTES
Return OB visit    S: Talia Talavera is a 34 y.o.  at 22w0d with a working estimated date of delivery of 2024, by Last Menstrual Period who presents for a routine prenatal visit. She denies vaginal bleeding, abdominal pain, leakage of fluid.     Concerns today: None    O: See prenatal flow sheet    A/P:  Returned TOLAC consent  Discussed pediatric cardiology recommendations for weekly auscultation while fetal heart arrhythmia persists, she is scheduled with him next week.  Discussed glucose screening, pt declines glucola and opts for finger sticks; instructed how to test x1 weeks between 24-28w6d, bring log  Reviewed warning signs and when to call midwife  Follow up in 2-4 weeks for a routine prenatal visit (depending on fetal heart arrhythmia)

## 2024-05-06 DIAGNOSIS — Z13.1 SCREENING FOR DIABETES MELLITUS: ICD-10-CM

## 2024-05-07 ENCOUNTER — APPOINTMENT (OUTPATIENT)
Dept: OBSTETRICS AND GYNECOLOGY | Facility: CLINIC | Age: 34
End: 2024-05-07
Payer: COMMERCIAL

## 2024-05-07 RX ORDER — LANCETS
EACH MISCELLANEOUS
Qty: 100 EACH | Refills: 2 | Status: SHIPPED | OUTPATIENT
Start: 2024-05-07

## 2024-05-07 RX ORDER — ISOPROPYL ALCOHOL 70 ML/100ML
SWAB TOPICAL
Qty: 100 EACH | Refills: 2 | Status: SHIPPED | OUTPATIENT
Start: 2024-05-07

## 2024-05-07 RX ORDER — DEXTROSE 4 G
TABLET,CHEWABLE ORAL
Qty: 1 EACH | Refills: 0 | Status: SHIPPED | OUTPATIENT
Start: 2024-05-07

## 2024-05-08 ENCOUNTER — ANCILLARY PROCEDURE (OUTPATIENT)
Dept: PEDIATRIC CARDIOLOGY | Facility: CLINIC | Age: 34
End: 2024-05-08
Payer: COMMERCIAL

## 2024-05-08 ENCOUNTER — OFFICE VISIT (OUTPATIENT)
Dept: PEDIATRIC CARDIOLOGY | Facility: CLINIC | Age: 34
End: 2024-05-08
Payer: COMMERCIAL

## 2024-05-08 VITALS
RESPIRATION RATE: 18 BRPM | HEART RATE: 74 BPM | WEIGHT: 159.39 LBS | TEMPERATURE: 98 F | BODY MASS INDEX: 27.21 KG/M2 | DIASTOLIC BLOOD PRESSURE: 65 MMHG | SYSTOLIC BLOOD PRESSURE: 95 MMHG | OXYGEN SATURATION: 98 % | HEIGHT: 64 IN

## 2024-05-08 DIAGNOSIS — O35.BXX0 ABNORMAL FETAL ECHOCARDIOGRAPHY AFFECTING ANTEPARTUM CARE OF MOTHER, SINGLE OR UNSPECIFIED FETUS (HHS-HCC): Primary | ICD-10-CM

## 2024-05-08 DIAGNOSIS — O36.8390 FETAL ARRHYTHMIA AFFECTING PREGNANCY, ANTEPARTUM (HHS-HCC): ICD-10-CM

## 2024-05-08 DIAGNOSIS — O35.8XX0 MATERNAL CARE FOR OTHER (SUSPECTED) FETAL ABNORMALITY AND DAMAGE, NOT APPLICABLE OR UNSPECIFIED (HHS-HCC): ICD-10-CM

## 2024-05-08 PROCEDURE — 99215 OFFICE O/P EST HI 40 MIN: CPT | Performed by: PEDIATRICS

## 2024-05-08 PROCEDURE — 93325 DOPPLER ECHO COLOR FLOW MAPG: CPT | Performed by: PEDIATRICS

## 2024-05-08 PROCEDURE — 76827 ECHO EXAM OF FETAL HEART: CPT | Performed by: PEDIATRICS

## 2024-05-08 NOTE — PROGRESS NOTES
"Talia Talavera was seen at the request of Lester Duncan MD for a chief complaint of persistent PAC's; a report with my findings is being sent via written or electronic means the referring physician with my recommendations for treatment.     I had the pleasure of seeing Talia Talavera in Pediatric Cardiology consultation at our [place] location as part of our prenatal heart program for follow up of a fetal echocardiogram on 24 that showed frequent PAC's with the majority being conducted and at least 2 atrial couplets. She was originally referred for PAC's that were first found on obstetric ultrasound on 24. Ms. Talavera had a follow up ultrasound on 4/15/24 that showed persistent PAC's and \"FHR had a few beats that were less than 110.\"  She is a 34 y.o. year-old  woman, currently 23w0d weeks gestation. Patient's last menstrual period was 2023.  Estimated Date of Delivery: 24.  There have been no pregnancy complications.   She has not been hospitalized during this pregnancy.  She declined aneuploidy testing.  She had a second trimester ultrasound which showed PAC's and brief episodes of FHR <110..      Prior to the visit, I personally reviewed the cardiac portions of the obstetrical ultrasound performed on 4/15/24.  There is normal segmental anatomy with normal 4 chamber, outflow tract and 3 vessel views.  There is no evidence of septation defect, right or left ventricular outflow obstruction or significant valvular regurgitation.  There were multiple episodes of ectopy with premature atrial contractions.  Some 2D images are suggestive of couplets.    Her previous obstetrical history is significant for 1 full term delivery.  Her past medical history is without complication.  She has no history of congenital heart disease, arrhythmia, cardiomyopathy, hypercholesterolemia, hypertension, diabetes, rheumatic heart disease, cancer, asthma, lupus, Sjogren syndrome, clotting disorder, " "depression, anxiety, alcohol abuse, phenylketonuria, or DiGeorge.  She has had a .  She takes vitamin D.  She has No Known Allergies.  She is currently taking prenatal vitamins.      Her family history is negative for congenital heart disease, early atherosclerosis, sudden cardiac death, long QT syndrome, cardiomyopathy, aortic aneurysm, or genetic or metabolic disease.      She currently lives with her spouse and daughter and is .  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  She denies illicit drug use or alcohol abuse.  She denies verbal, sexual, or physical abuse.     [place] [facility] [place]  Father of the baby's name: Rex    BP 95/65 (BP Location: Right arm, Patient Position: Sitting, BP Cuff Size: Large adult)   Pulse 74   Temp 36.7 °C (98 °F)   Resp 18   Ht 1.625 m (5' 3.98\")   Wt 72.3 kg (159 lb 6.3 oz)   LMP 2023   SpO2 98%   BMI 27.38 kg/m²     She was resting comfortably in the examination room and alert, active and in no respiratory distress. Skin was without rash.  HEENT: moist mucous membranes, no JVD, goiter. Breathing is not labored.  She was acyanotic.  There was no peripheral edema.   The abdomen was gravid, soft, nontender with normal bowel sounds.  The liver was not palpable.  The spleen tip was not palpable.  She had a normal gait and normal strength in all extremities.  Cranial nerves II - XII are intact.  She had no clubbing, cyanosis, or edema.    A two-dimensional and Doppler fetal echocardiogram was performed today and interpreted by me at 21w1d weeks gestation.  The fetal echocardiogram showed normal segmental anatomy with no structural abnormalities found.  There is normal cardiac function.  There is no evidence of septation defect, right or left ventricular outflow obstruction or significant valvular regurgitation.  The fetal heart rate was within normal limits with frequent premature atrial contractions, two couplets and one " possible triplet.  The spectral Doppler pattern across all valves, venous structures, and arterial structures was within normal limits.  There is no pericardial effusion.  Please see full report for details.    In summary, Talia Talavera is a 34 y.o. year-old  woman, currently 21w1d weeks gestation, who had a fetal echocardiogram that demonstrated frequent premature atrial contractions (PACs) with frequent bigeminy and trigeminy.  Her previous fetal echocardiogram demonstrated two atrial couplets and one possible triplet.  Today's echocardiogram was concerning in that there were episodes of sustained tachycardia that lasted about one minute each. There was  no sustained bradycardia. There was no evidence of ventricular or atrial dilatation and no effusions noted. Premature atrial beats are common findings (1-3% of pregnancies) and generally well tolerated unless sustained tachycardia develops. Intermittent fetal tachycardia (defined as less than 50% of scanning time) is also generally well tolerated.  It does increase the risk for sustained tachycardia.  No intervention recommended at this time. The risk of fetal tachycardia is increased given the intermittent periods of tachycardia.  Therefore, I did recommend a follow up scan in 1 week. We will try to arrange for a Doppler monitor at home.  At this time, we did not make any changes to delivery plan.      LOC 1  This cardiac anomaly is not expected to cause hemodynamic instability in the  period. No changes were made to current delivery plan.  Triage code 1: Delivery per OB at patient's preferred hospital.  Standard  care per  team.  Cardiology evaluation prior to discharge if born at [facility] or as an outpatient within 1-2 weeks if born at an outside facility.      Thank you for allowing me to participate in Talia's care.  If you have any further questions, please do not hesitate to contact me.     Baron  SURENDRA Abreu.  Fetal Heart Center, Director  Ambulatory Pediatric Cardiology   Division of Pediatric Cardiology  [facility]  [facility]   of Pediatrics, [employer] [facility] [employer]  [facility] [address]  [place] [address]  Office:  [phone]  Fax:       [phone]  e-mail:  [email or web address]

## 2024-05-08 NOTE — PATIENT INSTRUCTIONS
Your fetus has skipped heart beats.  The mostly consist of premature atrial contractions, or PACs for short.  She is having bursts of a fast heart beat.  We call this intermittent supraventricular tachycardia (SVT).  The fetus can tolerate this if they occur less than 50% of the scanning time.  This is the case today and the fetus is not in any danger.  We do not treat fetal PACs or intermittent tachycardia.  The intermittent episodes are lasting longer than when we first met you.  If they increase in duration, I would recommend that we treat them.  At this time, I would like you to come back in 1 week for a repeat fetal echocardiogram.  At this point, we don't need change your delivery plan.    If your pediatrician hears PACs after delivery, they can order an ECG and schedule a referral with a pediatric cardiologist.      Sometimes it is not possible to see all the heart structures because of the position or size of the fetus. This does not mean they are not there, but may mean that for technical reasons they cannot be assessed. Sometimes this information may not be important; while in some cases it means that definite answers are not possible. The echocardiographer will discuss this with you if necessary, and repeat studies are frequently performed later in the pregnancy.     Certain congenital heart abnormalities are also hard to detect by fetal echocardiograms, but often these are simple abnormalities. We do not mention this to concern you, but rather that you understand that there are technical limitations to such studies. It remains important that your pediatrician provides a normal careful medical examination of the baby (including the heart) takes place after birth, and if there were any suspicious cardiac findings, that these are evaluated in the usual way irrespective of the findings at fetal study.     Certain communications between the two sides of the circulation are normally present in all  developing babies and normally close after birth. We are not able to tell in advance whether this will occur, however there is only a tiny chance that they will not. Persistence of these structures is generally not a difficult problem to deal with.     We direct our attention only to the heart, where we have special expertise. This is not the same as your general obstetric ultrasound scan. Other ultrasound information about the fetus can be obtained from an obstetric ultrasonographer or your obstetrician.

## 2024-05-08 NOTE — LETTER
"May 9, 2024     Lester Duncan MD  [address]  Ob/Gyn  [facility] [address]    Patient: Talia Talavera   YOB: 1990   Date of Visit: 2024       Dear Dr. Lester Duncan MD:    Thank you for referring Talia Talavera to me for evaluation. Below are my notes for this consultation.  If you have questions, please do not hesitate to call me. I look forward to following your patient along with you.       Sincerely,     Baron Abreu MD      CC: MD Amy Umanzor, APRN-CN  Alesia Khan, APRN-[facility], RASHID Barboas, APRN-CNP  ______________________________________________________________________________________    Talia Talavera was seen at the request of Lester Duncan MD for a chief complaint of persistent PAC's; a report with my findings is being sent via written or electronic means the referring physician with my recommendations for treatment.     I had the pleasure of seeing Talia Talavera in Pediatric Cardiology consultation at our [place] location as part of our prenatal heart program for follow up of a fetal echocardiogram on 24 that showed frequent PAC's with the majority being conducted and at least 2 atrial couplets. She was originally referred for PAC's that were first found on obstetric ultrasound on 24. Ms. Talavera had a follow up ultrasound on 4/15/24 that showed persistent PAC's and \"FHR had a few beats that were less than 110.\"  She is a 34 y.o. year-old  woman, currently 23w0d weeks gestation. Patient's last menstrual period was 2023.  Estimated Date of Delivery: 24.  There have been no pregnancy complications.   She has not been hospitalized during this pregnancy.  She declined aneuploidy testing.  She had a second trimester ultrasound which showed PAC's and brief episodes of FHR <110..      Prior to the visit, I personally reviewed the cardiac portions of the obstetrical ultrasound performed on 4/15/24.  There " "is normal segmental anatomy with normal 4 chamber, outflow tract and 3 vessel views.  There is no evidence of septation defect, right or left ventricular outflow obstruction or significant valvular regurgitation.  There were multiple episodes of ectopy with premature atrial contractions.  Some 2D images are suggestive of couplets.    Her previous obstetrical history is significant for 1 full term delivery.  Her past medical history is without complication.  She has no history of congenital heart disease, arrhythmia, cardiomyopathy, hypercholesterolemia, hypertension, diabetes, rheumatic heart disease, cancer, asthma, lupus, Sjogren syndrome, clotting disorder, depression, anxiety, alcohol abuse, phenylketonuria, or DiGeorge.  She has had a .  She takes vitamin D.  She has No Known Allergies.  She is currently taking prenatal vitamins.      Her family history is negative for congenital heart disease, early atherosclerosis, sudden cardiac death, long QT syndrome, cardiomyopathy, aortic aneurysm, or genetic or metabolic disease.      She currently lives with her spouse and daughter and is .  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  She denies illicit drug use or alcohol abuse.  She denies verbal, sexual, or physical abuse.     Delivery Hospital: [facility] [place]  Father of the baby's name: Rex    BP 95/65 (BP Location: Right arm, Patient Position: Sitting, BP Cuff Size: Large adult)   Pulse 74   Temp 36.7 °C (98 °F)   Resp 18   Ht 1.625 m (5' 3.98\")   Wt 72.3 kg (159 lb 6.3 oz)   LMP 2023   SpO2 98%   BMI 27.38 kg/m²     She was resting comfortably in the examination room and alert, active and in no respiratory distress. Skin was without rash.  HEENT: moist mucous membranes, no JVD, goiter. Breathing is not labored.  She was acyanotic.  There was no peripheral edema.   The abdomen was gravid, soft, nontender with normal bowel sounds.  The liver was not palpable.  The " spleen tip was not palpable.  She had a normal gait and normal strength in all extremities.  Cranial nerves II - XII are intact.  She had no clubbing, cyanosis, or edema.    A two-dimensional and Doppler fetal echocardiogram was performed today and interpreted by me at 21w1d weeks gestation.  The fetal echocardiogram showed normal segmental anatomy with no structural abnormalities found.  There is normal cardiac function.  There is no evidence of septation defect, right or left ventricular outflow obstruction or significant valvular regurgitation.  The fetal heart rate was within normal limits with frequent premature atrial contractions, two couplets and one possible triplet.  The spectral Doppler pattern across all valves, venous structures, and arterial structures was within normal limits.  There is no pericardial effusion.  Please see full report for details.    In summary, Talia Talavera is a 34 y.o. year-old  woman, currently 21w1d weeks gestation, who had a fetal echocardiogram that demonstrated frequent premature atrial contractions (PACs) with frequent bigeminy and trigeminy.  Her previous fetal echocardiogram demonstrated two atrial couplets and one possible triplet.  Today's echocardiogram was concerning in that there were episodes of sustained tachycardia that lasted about one minute each. There was  no sustained bradycardia. There was no evidence of ventricular or atrial dilatation and no effusions noted. Premature atrial beats are common findings (1-3% of pregnancies) and generally well tolerated unless sustained tachycardia develops. Intermittent fetal tachycardia (defined as less than 50% of scanning time) is also generally well tolerated.  It does increase the risk for sustained tachycardia.  No intervention recommended at this time. The risk of fetal tachycardia is increased given the intermittent periods of tachycardia.  Therefore, I did recommend a follow up scan in 1 week. We will try to  arrange for a Doppler monitor at home.  At this time, we did not make any changes to delivery plan.      LOC 1  This cardiac anomaly is not expected to cause hemodynamic instability in the  period. No changes were made to current delivery plan.  Triage code 1: Delivery per OB at patient's preferred hospital.  Standard  care per  team.  Cardiology evaluation prior to discharge if born at [facility] or as an outpatient within 1-2 weeks if born at an outside facility.      Thank you for allowing me to participate in Talia's care.  If you have any further questions, please do not hesitate to contact me.     Baron Abreu M.D.  [facility], Director  Ambulatory Pediatric Cardiology   Division of Pediatric Cardiology  [facility]  [facility]   of Pediatrics, [facility] [employer]  [facility] [address]  [place] [address]  Office:  [phone]  Fax:       [phone]  e-mail:  [email or web address]

## 2024-05-15 ENCOUNTER — OFFICE VISIT (OUTPATIENT)
Dept: PEDIATRIC CARDIOLOGY | Facility: CLINIC | Age: 34
End: 2024-05-15
Payer: COMMERCIAL

## 2024-05-15 ENCOUNTER — ANCILLARY PROCEDURE (OUTPATIENT)
Dept: PEDIATRIC CARDIOLOGY | Facility: CLINIC | Age: 34
End: 2024-05-15
Payer: COMMERCIAL

## 2024-05-15 VITALS
HEIGHT: 64 IN | RESPIRATION RATE: 18 BRPM | HEART RATE: 79 BPM | DIASTOLIC BLOOD PRESSURE: 64 MMHG | BODY MASS INDEX: 27.4 KG/M2 | WEIGHT: 160.5 LBS | TEMPERATURE: 97.8 F | SYSTOLIC BLOOD PRESSURE: 99 MMHG | OXYGEN SATURATION: 98 %

## 2024-05-15 DIAGNOSIS — O35.BXX0 ABNORMAL FETAL ECHOCARDIOGRAPHY AFFECTING ANTEPARTUM CARE OF MOTHER, SINGLE OR UNSPECIFIED FETUS (HHS-HCC): Primary | ICD-10-CM

## 2024-05-15 DIAGNOSIS — I49.1 PAC (PREMATURE ATRIAL CONTRACTION): ICD-10-CM

## 2024-05-15 DIAGNOSIS — O35.BXX0 ABNORMAL FETAL ECHOCARDIOGRAPHY AFFECTING ANTEPARTUM CARE OF MOTHER, SINGLE OR UNSPECIFIED FETUS (HHS-HCC): ICD-10-CM

## 2024-05-15 DIAGNOSIS — O35.8XX0 MATERNAL CARE FOR OTHER (SUSPECTED) FETAL ABNORMALITY AND DAMAGE, NOT APPLICABLE OR UNSPECIFIED (HHS-HCC): ICD-10-CM

## 2024-05-15 PROCEDURE — 99215 OFFICE O/P EST HI 40 MIN: CPT | Performed by: PEDIATRICS

## 2024-05-15 PROCEDURE — 76827 ECHO EXAM OF FETAL HEART: CPT | Performed by: PEDIATRICS

## 2024-05-15 PROCEDURE — 93325 DOPPLER ECHO COLOR FLOW MAPG: CPT | Performed by: PEDIATRICS

## 2024-05-15 PROCEDURE — 76825 ECHO EXAM OF FETAL HEART: CPT | Performed by: PEDIATRICS

## 2024-05-15 NOTE — PROGRESS NOTES
"Talia Talavera was seen at the request of Lester Duncan MD for a chief complaint of persistent PAC's; a report with my findings is being sent via written or electronic means the referring physician with my recommendations for treatment.     I had the pleasure of seeing Talia Talavera in Pediatric Cardiology consultation at our [place] location as part of our prenatal heart program for follow up of a fetal echocardiogram on 24 that showed frequent PAC's with frequent bigeminy and trigeminy. She was originally referred for PAC's that were first found on obstetric ultrasound on 24. Ms. Talavera had a follow up ultrasound on 4/15/24 that showed persistent PAC's and \"FHR had a few beats that were less than 110.\"  She is a 34 y.o. year-old  woman, currently 24w0d weeks gestation. Patient's last menstrual period was 2023.  Estimated Date of Delivery: 24.  There have been no pregnancy complications.   She has not been hospitalized during this pregnancy.  She declined aneuploidy testing.  She had a second trimester ultrasound which showed PAC's and brief episodes of FHR <110..      Prior to the visit, I personally reviewed the cardiac portions of the obstetrical ultrasound performed on 4/15/24.  There is normal segmental anatomy with normal 4 chamber, outflow tract and 3 vessel views.  There is no evidence of septation defect, right or left ventricular outflow obstruction or significant valvular regurgitation.  There were multiple episodes of ectopy with premature atrial contractions.  Some 2D images are suggestive of couplets.    Her previous obstetrical history is significant for 1 full term delivery.  Her past medical history is without complication.  She has no history of congenital heart disease, arrhythmia, cardiomyopathy, hypercholesterolemia, hypertension, diabetes, rheumatic heart disease, cancer, asthma, lupus, Sjogren syndrome, clotting disorder, depression, anxiety, alcohol " abuse, phenylketonuria, or DiGeorge.  She has had a .  She takes vitamin D.  She has No Known Allergies.  She is currently taking prenatal vitamins.      Her family history is negative for congenital heart disease, early atherosclerosis, sudden cardiac death, long QT syndrome, cardiomyopathy, aortic aneurysm, or genetic or metabolic disease.      She currently lives with her spouse and daughter and is .  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  She denies illicit drug use or alcohol abuse.  She denies verbal, sexual, or physical abuse.     [place] [facility]  Father of the baby's name: Rex    LMP 2023     She was resting comfortably in the examination room and alert, active and in no respiratory distress. Skin was without rash.  HEENT: moist mucous membranes, no JVD, goiter. Breathing is not labored.  She was acyanotic.  There was no peripheral edema.   The abdomen was gravid, soft, nontender with normal bowel sounds.  The liver was not palpable.  The spleen tip was not palpable.  She had a normal gait and normal strength in all extremities.  Cranial nerves II - XII are intact.  She had no clubbing, cyanosis, or edema.    A two-dimensional and Doppler fetal echocardiogram was performed today and interpreted by me at 24w0d weeks gestation.  The fetal echocardiogram showed normal segmental anatomy with no structural abnormalities found.  There is normal cardiac function.  There is no evidence of septation defect, right or left ventricular outflow obstruction or significant valvular regurgitation.  The fetal heart rate was within normal limits with frequent premature atrial contractions. The spectral Doppler pattern across all valves, venous structures, and arterial structures was within normal limits.  There is no pericardial effusion.  Please see full report for details.    In summary, Talia Talavera is a 34 y.o. year-old  woman, currently 24w0d weeks  gestation, who had a fetal echocardiogram that demonstrated frequent premature atrial contractions (PACs).  There were occasional episodes of atrial bigeminy.  There were rare atrial couplets with no triplets or sustained tachycardia.  Her previous fetal echocardiogram demonstrated episodes of tachycardia with heart rates in the 200 range.  The episodes were self limited.   There was  no sustained bradycardia. There was no evidence of ventricular or atrial dilatation and no effusions noted. Premature atrial beats are common findings (1-3% of pregnancies) and generally well tolerated unless sustained tachycardia develops. Intermittent fetal tachycardia (defined as less than 50% of scanning time) is also generally well tolerated.  It does increase the risk for sustained tachycardia.  No intervention recommended at this time. We provided Talia with a Doppler to check heart rates at home with instructions of checking twice per day.  She will call us if the heart rate is above 200 bpm.  My recommendations are to recheck in 20 minutes if that is the case.  If there is still tachycardia at 20 minutes, she was instructed to go to [facility] triage for long term monitoring.  The risk of fetal tachycardia is increased given the intermittent periods of tachycardia.  Therefore, I did recommend a follow up scan in 2 weeks.  At this time, we did not make any changes to delivery plan.      LOC 1  This cardiac anomaly is not expected to cause hemodynamic instability in the  period. No changes were made to current delivery plan.  Triage code 1: Delivery per OB at patient's preferred hospital.  Standard  care per  team.  Cardiology evaluation prior to discharge if born at [facility] or as an outpatient within 1-2 weeks if born at an outside facility.      Thank you for allowing me to participate in Talia's care.  If you have any further questions, please do not hesitate to  contact me.     Baron Abreu M.D.  Fetal Heart Center, Director  Ambulatory Pediatric Cardiology   Division of Pediatric Cardiology  [facility]  [facility]   of Pediatrics, [facility] [employer] [facility]  [facility] [address]  [place] [address]  Office:  [phone]  Fax:       [phone]  e-mail:  [email or web address]

## 2024-05-15 NOTE — LETTER
"May 15, 2024     Lester Duncan MD  [address]  Ob/Gyn  [facility] [address]    Patient: Talia Talavera   YOB: 1990   Date of Visit: 5/15/2024       Dear Dr. Lester Dunacn MD:    Thank you for referring Talia Talavera to me for evaluation. Below are my notes for this consultation.  If you have questions, please do not hesitate to call me. I look forward to following your patient along with you.       Sincerely,     Baron Abreu MD      CC: MD Amy Umanzor, APRN-CN  Alesia Khan, APRN-CN, RASHID Barbosa, APRN-CNP  ______________________________________________________________________________________    Talia Talavera was seen at the request of Lester Duncan MD for a chief complaint of persistent PAC's; a report with my findings is being sent via written or electronic means the referring physician with my recommendations for treatment.     I had the pleasure of seeing Talia Talavera in Pediatric Cardiology consultation at our [place] location as part of our prenatal heart program for follow up of a fetal echocardiogram on 24 that showed frequent PAC's with frequent bigeminy and trigeminy. She was originally referred for PAC's that were first found on obstetric ultrasound on 24. Ms. Talavera had a follow up ultrasound on 4/15/24 that showed persistent PAC's and \"FHR had a few beats that were less than 110.\"  She is a 34 y.o. year-old  woman, currently 24w0d weeks gestation. Patient's last menstrual period was 2023.  Estimated Date of Delivery: 24.  There have been no pregnancy complications.   She has not been hospitalized during this pregnancy.  She declined aneuploidy testing.  She had a second trimester ultrasound which showed PAC's and brief episodes of FHR <110..      Prior to the visit, I personally reviewed the cardiac portions of the obstetrical ultrasound performed on 4/15/24.  There is normal segmental " anatomy with normal 4 chamber, outflow tract and 3 vessel views.  There is no evidence of septation defect, right or left ventricular outflow obstruction or significant valvular regurgitation.  There were multiple episodes of ectopy with premature atrial contractions.  Some 2D images are suggestive of couplets.    Her previous obstetrical history is significant for 1 full term delivery.  Her past medical history is without complication.  She has no history of congenital heart disease, arrhythmia, cardiomyopathy, hypercholesterolemia, hypertension, diabetes, rheumatic heart disease, cancer, asthma, lupus, Sjogren syndrome, clotting disorder, depression, anxiety, alcohol abuse, phenylketonuria, or DiGeorge.  She has had a .  She takes vitamin D.  She has No Known Allergies.  She is currently taking prenatal vitamins.      Her family history is negative for congenital heart disease, early atherosclerosis, sudden cardiac death, long QT syndrome, cardiomyopathy, aortic aneurysm, or genetic or metabolic disease.      She currently lives with her spouse and daughter and is .  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  She denies illicit drug use or alcohol abuse.  She denies verbal, sexual, or physical abuse.     Delivery Hospital: [facility] [place]  Father of the baby's name: Rex    LMP 2023     She was resting comfortably in the examination room and alert, active and in no respiratory distress. Skin was without rash.  HEENT: moist mucous membranes, no JVD, goiter. Breathing is not labored.  She was acyanotic.  There was no peripheral edema.   The abdomen was gravid, soft, nontender with normal bowel sounds.  The liver was not palpable.  The spleen tip was not palpable.  She had a normal gait and normal strength in all extremities.  Cranial nerves II - XII are intact.  She had no clubbing, cyanosis, or edema.    A two-dimensional and Doppler fetal echocardiogram was performed today and  interpreted by me at 24w0d weeks gestation.  The fetal echocardiogram showed normal segmental anatomy with no structural abnormalities found.  There is normal cardiac function.  There is no evidence of septation defect, right or left ventricular outflow obstruction or significant valvular regurgitation.  The fetal heart rate was within normal limits with frequent premature atrial contractions. The spectral Doppler pattern across all valves, venous structures, and arterial structures was within normal limits.  There is no pericardial effusion.  Please see full report for details.    In summary, Talia Talavera is a 34 y.o. year-old  woman, currently 24w0d weeks gestation, who had a fetal echocardiogram that demonstrated frequent premature atrial contractions (PACs).  There were occasional episodes of atrial bigeminy.  There were rare atrial couplets with no triplets or sustained tachycardia.  Her previous fetal echocardiogram demonstrated episodes of tachycardia with heart rates in the 200 range.  The episodes were self limited.   There was  no sustained bradycardia. There was no evidence of ventricular or atrial dilatation and no effusions noted. Premature atrial beats are common findings (1-3% of pregnancies) and generally well tolerated unless sustained tachycardia develops. Intermittent fetal tachycardia (defined as less than 50% of scanning time) is also generally well tolerated.  It does increase the risk for sustained tachycardia.  No intervention recommended at this time. We provided Talia with a Doppler to check heart rates at home with instructions of checking twice per day.  She will call us if the heart rate is above 200 bpm.  My recommendations are to recheck in 20 minutes if that is the case.  If there is still tachycardia at 20 minutes, she was instructed to go to [facility] triage for long term monitoring.  The risk of fetal tachycardia is increased given the intermittent  periods of tachycardia.  Therefore, I did recommend a follow up scan in 2 weeks.  At this time, we did not make any changes to delivery plan.      LOC 1  This cardiac anomaly is not expected to cause hemodynamic instability in the  period. No changes were made to current delivery plan.  Triage code 1: Delivery per OB at patient's preferred hospital.  Standard  care per  team.  Cardiology evaluation prior to discharge if born at [facility] or as an outpatient within 1-2 weeks if born at an outside facility.      Thank you for allowing me to participate in Talia's care.  If you have any further questions, please do not hesitate to contact me.     Baron Abreu M.D.  Fetal Heart Center, Director  Ambulatory Pediatric Cardiology   Division of Pediatric Cardiology  [facility]  [facility]   of Pediatrics, [facility] [employer]  [facility] [address]  [place] [address]  Office:  [phone]  Fax:       [phone]  e-mail:  [email or web address]

## 2024-05-15 NOTE — PATIENT INSTRUCTIONS
Your fetus has skipped heart beats.  The mostly consist of premature atrial contractions, or PACs for short.  We do not treat fetal PACs or intermittent tachycardia.  At this time, I would like you to come back in 2 weeks for a repeat fetal echocardiogram.  At this point, we don't need change your delivery plan.    If your pediatrician hears PACs after delivery, they can order an ECG and schedule a referral with a pediatric cardiologist.      Sometimes it is not possible to see all the heart structures because of the position or size of the fetus. This does not mean they are not there, but may mean that for technical reasons they cannot be assessed. Sometimes this information may not be important; while in some cases it means that definite answers are not possible. The echocardiographer will discuss this with you if necessary, and repeat studies are frequently performed later in the pregnancy.     Certain congenital heart abnormalities are also hard to detect by fetal echocardiograms, but often these are simple abnormalities. We do not mention this to concern you, but rather that you understand that there are technical limitations to such studies. It remains important that your pediatrician provides a normal careful medical examination of the baby (including the heart) takes place after birth, and if there were any suspicious cardiac findings, that these are evaluated in the usual way irrespective of the findings at fetal study.     Certain communications between the two sides of the circulation are normally present in all developing babies and normally close after birth. We are not able to tell in advance whether this will occur, however there is only a tiny chance that they will not. Persistence of these structures is generally not a difficult problem to deal with.     We direct our attention only to the heart, where we have special expertise. This is not the same as your general obstetric ultrasound scan.  Other ultrasound information about the fetus can be obtained from an obstetric ultrasonographer or your obstetrician.

## 2024-05-29 ENCOUNTER — ANCILLARY PROCEDURE (OUTPATIENT)
Dept: PEDIATRIC CARDIOLOGY | Facility: CLINIC | Age: 34
End: 2024-05-29
Payer: COMMERCIAL

## 2024-05-29 ENCOUNTER — OFFICE VISIT (OUTPATIENT)
Dept: PEDIATRIC CARDIOLOGY | Facility: CLINIC | Age: 34
End: 2024-05-29
Payer: COMMERCIAL

## 2024-05-29 ENCOUNTER — ROUTINE PRENATAL (OUTPATIENT)
Dept: OBSTETRICS AND GYNECOLOGY | Facility: CLINIC | Age: 34
End: 2024-05-29
Payer: COMMERCIAL

## 2024-05-29 VITALS
SYSTOLIC BLOOD PRESSURE: 96 MMHG | RESPIRATION RATE: 18 BRPM | WEIGHT: 164.02 LBS | HEIGHT: 64 IN | OXYGEN SATURATION: 98 % | BODY MASS INDEX: 28 KG/M2 | TEMPERATURE: 97.5 F | DIASTOLIC BLOOD PRESSURE: 63 MMHG | HEART RATE: 73 BPM

## 2024-05-29 VITALS
DIASTOLIC BLOOD PRESSURE: 72 MMHG | WEIGHT: 164.38 LBS | BODY MASS INDEX: 27.89 KG/M2 | SYSTOLIC BLOOD PRESSURE: 115 MMHG

## 2024-05-29 DIAGNOSIS — O36.8390 FETAL ARRHYTHMIA AFFECTING PREGNANCY, ANTEPARTUM (HHS-HCC): Primary | ICD-10-CM

## 2024-05-29 DIAGNOSIS — O35.BXX0 ABNORMAL FETAL ECHOCARDIOGRAPHY AFFECTING ANTEPARTUM CARE OF MOTHER, SINGLE OR UNSPECIFIED FETUS (HHS-HCC): ICD-10-CM

## 2024-05-29 DIAGNOSIS — O35.00X0: ICD-10-CM

## 2024-05-29 DIAGNOSIS — Z34.90: Primary | ICD-10-CM

## 2024-05-29 DIAGNOSIS — Z3A.26 26 WEEKS GESTATION OF PREGNANCY (HHS-HCC): ICD-10-CM

## 2024-05-29 PROCEDURE — 76825 ECHO EXAM OF FETAL HEART: CPT | Performed by: PEDIATRICS

## 2024-05-29 PROCEDURE — 76827 ECHO EXAM OF FETAL HEART: CPT | Performed by: PEDIATRICS

## 2024-05-29 PROCEDURE — 93325 DOPPLER ECHO COLOR FLOW MAPG: CPT | Performed by: PEDIATRICS

## 2024-05-29 PROCEDURE — 0501F PRENATAL FLOW SHEET: CPT | Performed by: ADVANCED PRACTICE MIDWIFE

## 2024-05-29 PROCEDURE — 99214 OFFICE O/P EST MOD 30 MIN: CPT | Performed by: PEDIATRICS

## 2024-05-29 NOTE — LETTER
"May 29, 2024     Lester Duncan MD  [address]  Ob/Gyn  [facility] [address]    Patient: Talia Talavera   YOB: 1990   Date of Visit: 2024       Dear Dr. Lester Duncan MD:    Thank you for referring Talia Talavera to me for evaluation. Below are my notes for this consultation.  If you have questions, please do not hesitate to call me. I look forward to following your patient along with you.       Sincerely,     Barno Abreu MD      CC: MD Amy Umanzor, APRN-CN  Alesia Khan, APRN-CN, RASHID Barbosa, APRN-CNP  ______________________________________________________________________________________    Talia Talavera was seen at the request of Lester Duncan MD for a chief complaint of persistent PAC's; a report with my findings is being sent via written or electronic means the referring physician with my recommendations for treatment.     I had the pleasure of seeing Talia Talavera in Pediatric Cardiology consultation at our [place] location as part of our prenatal heart program for follow up of a fetal echocardiogram on 5/15/24 that showed frequent PAC's with frequent bigeminy and trigeminy. She was originally referred for PAC's that were first found on obstetric ultrasound on 24. Ms. Talavera had a follow up ultrasound on 4/15/24 that showed persistent PAC's and \"FHR had a few beats that were less than 110.\"  She is a 34 y.o. year-old  woman, currently 26w0d weeks gestation. Patient's last menstrual period was 2023.  Estimated Date of Delivery: 24.  There have been no pregnancy complications.   She has not been hospitalized during this pregnancy.  She declined aneuploidy testing.  She had a second trimester ultrasound which showed PAC's and brief episodes of FHR <110. She has been checking the FHR twice daily at home and states that HR's have been mostly 130's-150's.     Prior to the visit, I personally reviewed the " "cardiac portions of the obstetrical ultrasound performed on 4/15/24.  There is normal segmental anatomy with normal 4 chamber, outflow tract and 3 vessel views.  There is no evidence of septation defect, right or left ventricular outflow obstruction or significant valvular regurgitation.  There were multiple episodes of ectopy with premature atrial contractions.  Some 2D images are suggestive of couplets.    Her previous obstetrical history is significant for 1 full term delivery.  Her past medical history is without complication.  She has no history of congenital heart disease, arrhythmia, cardiomyopathy, hypercholesterolemia, hypertension, diabetes, rheumatic heart disease, cancer, asthma, lupus, Sjogren syndrome, clotting disorder, depression, anxiety, alcohol abuse, phenylketonuria, or DiGeorge.  She has had a .  She takes vitamin D.  She has No Known Allergies.  She is currently taking prenatal vitamins.      Her family history is negative for congenital heart disease, early atherosclerosis, sudden cardiac death, long QT syndrome, cardiomyopathy, aortic aneurysm, or genetic or metabolic disease.      She currently lives with her spouse and daughter and is .  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  She denies illicit drug use or alcohol abuse.  She denies verbal, sexual, or physical abuse.     Delivery Hospital: [facility]  Father of the baby's name: Rex    BP 96/63 (BP Location: Right arm, Patient Position: Sitting, BP Cuff Size: Large adult)   Pulse 73   Temp 36.4 °C (97.5 °F)   Resp 18   Ht 1.635 m (5' 4.37\")   Wt 74.4 kg (164 lb 0.4 oz)   LMP 2023   SpO2 98%   BMI 27.83 kg/m²     She was resting comfortably in the examination room and alert, active and in no respiratory distress. Skin was without rash.  HEENT: moist mucous membranes, no JVD, goiter. Breathing is not labored.  She was acyanotic.  There was no peripheral edema.   The abdomen was gravid, " soft, nontender with normal bowel sounds.  The liver was not palpable.  The spleen tip was not palpable.  She had a normal gait and normal strength in all extremities.  Cranial nerves II - XII are intact.  She had no clubbing, cyanosis, or edema.    A two-dimensional and Doppler fetal echocardiogram was performed today and interpreted by me at 26w0d weeks gestation.  The fetal echocardiogram showed normal segmental anatomy with no structural abnormalities found.  There is normal cardiac function.  There is no evidence of septation defect, right or left ventricular outflow obstruction or significant valvular regurgitation.  The fetal heart rate was within normal limits with frequent premature atrial contractions. The spectral Doppler pattern across all valves, venous structures, and arterial structures was within normal limits.  There is no pericardial effusion.  Please see full report for details.    In summary, Talia Talavera is a 34 y.o. year-old  woman, currently 26w0d weeks gestation, who had a fetal echocardiogram that demonstrated frequent premature atrial contractions (PACs).  There were occasional episodes of atrial bigeminy.  There were rare atrial couplets with no triplets or sustained tachycardia.  Her previous fetal echocardiogram demonstrated episodes of tachycardia with heart rates in the 200 range.  The episodes were self limited.   There was  no sustained bradycardia. There was no evidence of ventricular or atrial dilatation and no effusions noted. Premature atrial beats are common findings (1-3% of pregnancies) and generally well tolerated unless sustained tachycardia develops. Intermittent fetal tachycardia (defined as less than 50% of scanning time) is also generally well tolerated.  It does increase the risk for sustained tachycardia.  No intervention recommended at this time. We provided Talia with a Doppler to check heart rates at home with instructions of checking twice per day.   She will call us if the heart rate is above 180 bpm.  My recommendations are to recheck in 20 minutes if that is the case.  If there is still tachycardia at 20 minutes, she was instructed to go to [facility] triage for long term monitoring.  The risk of fetal tachycardia is increased given the intermittent periods of tachycardia.  Therefore, I did recommend a follow up scan in 2 weeks.  At this time, we did not make any changes to delivery plan.      LOC 1  This cardiac anomaly is not expected to cause hemodynamic instability in the  period. No changes were made to current delivery plan.  Triage code 1: Delivery per OB at patient's preferred hospital.  Standard  care per  team.  Cardiology evaluation prior to discharge if born at [facility] or as an outpatient within 1-2 weeks if born at an outside facility.      Thank you for allowing me to participate in Talia's care.  If you have any further questions, please do not hesitate to contact me.     Baron Abreu M.D.  [facility], Director  Ambulatory Pediatric Cardiology   Division of Pediatric Cardiology  [facility]  [facility]   of Pediatrics, [employer]  [facility] -   [address]  [place] [address]  Office:  [phone]  Fax:       [phone]  e-mail:  [email or web address]

## 2024-05-29 NOTE — PROGRESS NOTES
Return OB visit    S: Talia Talavera is a 34 y.o.  at 26w0d with a working estimated date of delivery of 2024, by Last Menstrual Period who presents for a routine prenatal visit. She denies vaginal bleeding, abdominal pain, leakage of fluid.     Concerns today: Patient presents today for routine prenatal visit and has no questions or concerns.    O: See prenatal flow sheet    A/P:  Cardiology follow up without arrhythmia, per cardiologist follow up every 2 weeks  Questions regarding screening for GDM, discussed importance of screening and [facility] guidelines; pt to consider options  Reviewed warning signs and when to call midwife  Follow up in 2 weeks for a routine prenatal visit

## 2024-05-29 NOTE — PROGRESS NOTES
"Talia Talavera was seen at the request of Lester Duncan MD for a chief complaint of persistent PAC's; a report with my findings is being sent via written or electronic means the referring physician with my recommendations for treatment.     I had the pleasure of seeing Talia Talavera in Pediatric Cardiology consultation at our [place] location as part of our prenatal heart program for follow up of a fetal echocardiogram on 5/15/24 that showed frequent PAC's with frequent bigeminy and trigeminy. She was originally referred for PAC's that were first found on obstetric ultrasound on 24. Ms. Talavera had a follow up ultrasound on 4/15/24 that showed persistent PAC's and \"FHR had a few beats that were less than 110.\"  She is a 34 y.o. year-old  woman, currently 26w0d weeks gestation. Patient's last menstrual period was 2023.  Estimated Date of Delivery: 24.  There have been no pregnancy complications.   She has not been hospitalized during this pregnancy.  She declined aneuploidy testing.  She had a second trimester ultrasound which showed PAC's and brief episodes of FHR <110. She has been checking the FHR twice daily at home and states that HR's have been mostly 130's-150's.     Prior to the visit, I personally reviewed the cardiac portions of the obstetrical ultrasound performed on 4/15/24.  There is normal segmental anatomy with normal 4 chamber, outflow tract and 3 vessel views.  There is no evidence of septation defect, right or left ventricular outflow obstruction or significant valvular regurgitation.  There were multiple episodes of ectopy with premature atrial contractions.  Some 2D images are suggestive of couplets.    Her previous obstetrical history is significant for 1 full term delivery.  Her past medical history is without complication.  She has no history of congenital heart disease, arrhythmia, cardiomyopathy, hypercholesterolemia, hypertension, diabetes, rheumatic heart " "disease, cancer, asthma, lupus, Sjogren syndrome, clotting disorder, depression, anxiety, alcohol abuse, phenylketonuria, or DiGeorge.  She has had a .  She takes vitamin D.  She has No Known Allergies.  She is currently taking prenatal vitamins.      Her family history is negative for congenital heart disease, early atherosclerosis, sudden cardiac death, long QT syndrome, cardiomyopathy, aortic aneurysm, or genetic or metabolic disease.      She currently lives with her spouse and daughter and is .  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  She denies illicit drug use or alcohol abuse.  She denies verbal, sexual, or physical abuse.     [place] [facility] [place]  Father of the baby's name: Rex    BP 96/63 (BP Location: Right arm, Patient Position: Sitting, BP Cuff Size: Large adult)   Pulse 73   Temp 36.4 °C (97.5 °F)   Resp 18   Ht 1.635 m (5' 4.37\")   Wt 74.4 kg (164 lb 0.4 oz)   LMP 2023   SpO2 98%   BMI 27.83 kg/m²     She was resting comfortably in the examination room and alert, active and in no respiratory distress. Skin was without rash.  HEENT: moist mucous membranes, no JVD, goiter. Breathing is not labored.  She was acyanotic.  There was no peripheral edema.   The abdomen was gravid, soft, nontender with normal bowel sounds.  The liver was not palpable.  The spleen tip was not palpable.  She had a normal gait and normal strength in all extremities.  Cranial nerves II - XII are intact.  She had no clubbing, cyanosis, or edema.    A two-dimensional and Doppler fetal echocardiogram was performed today and interpreted by me at 26w0d weeks gestation.  The fetal echocardiogram showed normal segmental anatomy with no structural abnormalities found.  There is normal cardiac function.  There is no evidence of septation defect, right or left ventricular outflow obstruction or significant valvular regurgitation.  The fetal heart rate was within normal limits " with frequent premature atrial contractions. The spectral Doppler pattern across all valves, venous structures, and arterial structures was within normal limits.  There is no pericardial effusion.  Please see full report for details.    In summary, Talia Talavera is a 34 y.o. year-old  woman, currently 26w0d weeks gestation, who had a fetal echocardiogram that demonstrated frequent premature atrial contractions (PACs).  There were occasional episodes of atrial bigeminy.  There were rare atrial couplets with no triplets or sustained tachycardia.  Her previous fetal echocardiogram demonstrated episodes of tachycardia with heart rates in the 200 range.  The episodes were self limited.   There was  no sustained bradycardia. There was no evidence of ventricular or atrial dilatation and no effusions noted. Premature atrial beats are common findings (1-3% of pregnancies) and generally well tolerated unless sustained tachycardia develops. Intermittent fetal tachycardia (defined as less than 50% of scanning time) is also generally well tolerated.  It does increase the risk for sustained tachycardia.  No intervention recommended at this time. We provided Talia with a Doppler to check heart rates at home with instructions of checking twice per day.  She will call us if the heart rate is above 180 bpm.  My recommendations are to recheck in 20 minutes if that is the case.  If there is still tachycardia at 20 minutes, she was instructed to go to [facility] triage for long term monitoring.  The risk of fetal tachycardia is increased given the intermittent periods of tachycardia.  Therefore, I did recommend a follow up scan in 2 weeks.  At this time, we did not make any changes to delivery plan.      LOC 1  This cardiac anomaly is not expected to cause hemodynamic instability in the  period. No changes were made to current delivery plan.  Triage code 1: Delivery per OB at patient's preferred  hospital.  Standard  care per  team.  Cardiology evaluation prior to discharge if born at [facility] or as an outpatient within 1-2 weeks if born at an outside facility.      Thank you for allowing me to participate in Talia's care.  If you have any further questions, please do not hesitate to contact me.     Baron Abreu M.D.  [facility]etal [facility], Director  Ambulatory Pediatric Cardiology   Division of Pediatric Cardiology  [facility]  [facility]   of Pediatrics, [facility] [employer]  [facility] -   [address]  [place] [id number]  Office:  [phone]  Fax:       [phone]  e-mail:  [email or web address]

## 2024-06-11 ENCOUNTER — HOSPITAL ENCOUNTER (OUTPATIENT)
Dept: PEDIATRIC CARDIOLOGY | Facility: HOSPITAL | Age: 34
Discharge: HOME | End: 2024-06-11
Payer: COMMERCIAL

## 2024-06-11 ENCOUNTER — OFFICE VISIT (OUTPATIENT)
Dept: PEDIATRIC CARDIOLOGY | Facility: HOSPITAL | Age: 34
End: 2024-06-11
Payer: COMMERCIAL

## 2024-06-11 VITALS
DIASTOLIC BLOOD PRESSURE: 72 MMHG | SYSTOLIC BLOOD PRESSURE: 111 MMHG | BODY MASS INDEX: 28.6 KG/M2 | HEIGHT: 64 IN | OXYGEN SATURATION: 100 % | WEIGHT: 167.55 LBS | HEART RATE: 83 BPM

## 2024-06-11 DIAGNOSIS — O28.3 ABNORMAL ULTRASONIC FINDING ON ANTENATAL SCREENING OF MOTHER: ICD-10-CM

## 2024-06-11 DIAGNOSIS — O35.BXX0 ABNORMAL FETAL ECHOCARDIOGRAPHY AFFECTING ANTEPARTUM CARE OF MOTHER, SINGLE OR UNSPECIFIED FETUS (HHS-HCC): ICD-10-CM

## 2024-06-11 DIAGNOSIS — O36.8390 FETAL ARRHYTHMIA AFFECTING PREGNANCY, ANTEPARTUM (HHS-HCC): Primary | ICD-10-CM

## 2024-06-11 DIAGNOSIS — Z34.81 NORMAL PREGNANCY IN MULTIGRAVIDA IN FIRST TRIMESTER (HHS-HCC): ICD-10-CM

## 2024-06-11 PROCEDURE — 93325 DOPPLER ECHO COLOR FLOW MAPG: CPT | Performed by: PEDIATRICS

## 2024-06-11 PROCEDURE — 99215 OFFICE O/P EST HI 40 MIN: CPT | Performed by: PEDIATRICS

## 2024-06-11 PROCEDURE — 76825 ECHO EXAM OF FETAL HEART: CPT

## 2024-06-11 PROCEDURE — 76825 ECHO EXAM OF FETAL HEART: CPT | Performed by: PEDIATRICS

## 2024-06-11 PROCEDURE — 76827 ECHO EXAM OF FETAL HEART: CPT | Performed by: PEDIATRICS

## 2024-06-17 NOTE — PROGRESS NOTES
"Talia Talavera was seen at the request of Lester Duncan MD for a chief complaint of persistent PAC's; a report with my findings is being sent via written or electronic means the referring physician with my recommendations for treatment.     I had the pleasure of seeing Talia Talavera in Pediatric Cardiology consultation at our [facility] location as part of our prenatal heart program for follow up of a fetal echocardiogram on 5/15/24 performed by my colleague Dr. Abreu that showed frequent PAC's with frequent bigeminy and trigeminy. She was originally referred for PAC's that were first found on obstetric ultrasound on 24. Ms. Talavera had a follow up ultrasound on 4/15/24 that showed persistent PAC's and \"FHR had a few beats that were less than 110.\"  She is a 34 y.o. year-old  woman, currently 26w0d weeks gestation. Patient's last menstrual period was 2023.  Estimated Date of Delivery: 24.  There have been no pregnancy complications.   She has not been hospitalized during this pregnancy.  She declined aneuploidy testing.  She had a second trimester ultrasound which showed PAC's and brief episodes of FHR <110. She has been checking the FHR twice daily at home and states that HR's have been mostly 130's-150's.     Her previous obstetrical history is significant for 1 full term delivery.  Her past medical history is without complication.  She has no history of congenital heart disease, arrhythmia, cardiomyopathy, hypercholesterolemia, hypertension, diabetes, rheumatic heart disease, cancer, asthma, lupus, Sjogren syndrome, clotting disorder, depression, anxiety, alcohol abuse, phenylketonuria, or DiGeorge.  She has had a .  She takes vitamin D.  She has No Known Allergies.  She is currently taking prenatal vitamins.      Her family history is negative for congenital heart disease, early atherosclerosis, sudden cardiac death, long QT syndrome, cardiomyopathy, aortic aneurysm, or " "genetic or metabolic disease.      She currently lives with her spouse and daughter and is .  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  She denies illicit drug use or alcohol abuse.  She denies verbal, sexual, or physical abuse.     Delivery Hospital: [facility] [place]  Father of the baby's name: Rex    /72   Pulse 83   Ht 1.626 m (5' 4.02\")   Wt 76 kg (167 lb 8.8 oz)   LMP 2023   SpO2 100%   BMI 28.75 kg/m²     Fetal Echocardiogram performed at 27+2 weeks of gestation:   1. Normal cardiac segmental anatomy.   2. Qualitatively normal right ventricular size and systolic function.   3. Qualitatively there appears to be mild RVH.   4. Qualitatively normal left ventricular size and systolic function.   5. Normal fetal heart rate. There is 1:1 atrioventricular conduction. No ectopy or arrhythmia seen.   6. There is trivial pericardial effusion along the left heart border.    A two-dimensional and Doppler fetal echocardiogram was performed today and interpreted by me at 27w2d weeks gestation.  The fetal echocardiogram showed normal segmental anatomy with no structural abnormalities found.  There is normal cardiac function.  There is no evidence of septation defect, right or left ventricular outflow obstruction or significant valvular regurgitation. There was qualitatively mild right ventricular hypertrophy. The fetal heart rate was within normal limits without any premature atrial contractions. The spectral Doppler pattern across all valves, venous structures, and arterial structures was within normal limits.  There is no pericardial effusion.  Please see full report for details.    In summary, Talia Talavera is a 34 y.o. year-old  woman, currently 27w2d weeks gestation, who had a previous fetal echocardiogram that demonstrated frequent premature atrial contractions (PACs). Her fetal echocardiogram was quite reassuring today with no significant arrhythmia, normal cardiac " function and no effusions. There was qualitatively mild right ventricular hypertrophy which can be followed over her pregnancy or post delivery.    I will keep her prior management plan as described by Dr. Abreu below.   Premature atrial beats are common findings (1-3% of pregnancies) and generally well tolerated unless sustained tachycardia develops. Intermittent fetal tachycardia (defined as less than 50% of scanning time) is also generally well tolerated.  It does increase the risk for sustained tachycardia.  No intervention recommended at this time. We provided Talia with a Doppler to check heart rates at home with instructions of checking twice per day.  She will call us if the heart rate is above 180 bpm.  My recommendations are to recheck in 20 minutes if that is the case.  If there is still tachycardia at 20 minutes, she was instructed to go to [facility] triage for long term monitoring.  The risk of fetal tachycardia is increased given the intermittent periods of tachycardia.  Therefore, I did recommend a follow up scan in 2 weeks.  At this time, we did not make any changes to delivery plan.      LOC 1  This cardiac anomaly is not expected to cause hemodynamic instability in the  period. No changes were made to current delivery plan.  Triage code 1: Delivery per OB at patient's preferred hospital.  Standard  care per  team.  Cardiology evaluation prior to discharge if born at [facility] or as an outpatient within 1-2 weeks if born at an outside facility.      Thank you for allowing me to participate in Talia's care.  If you have any further questions, please do not hesitate to contact me.

## 2024-06-26 ENCOUNTER — APPOINTMENT (OUTPATIENT)
Dept: OBSTETRICS AND GYNECOLOGY | Facility: CLINIC | Age: 34
End: 2024-06-26
Payer: COMMERCIAL

## 2024-06-26 ENCOUNTER — APPOINTMENT (OUTPATIENT)
Dept: PEDIATRIC CARDIOLOGY | Facility: CLINIC | Age: 34
End: 2024-06-26
Payer: COMMERCIAL

## 2024-07-01 ENCOUNTER — APPOINTMENT (OUTPATIENT)
Dept: OBSTETRICS AND GYNECOLOGY | Facility: CLINIC | Age: 34
End: 2024-07-01
Payer: COMMERCIAL

## 2024-07-01 VITALS
BODY MASS INDEX: 29.21 KG/M2 | SYSTOLIC BLOOD PRESSURE: 126 MMHG | DIASTOLIC BLOOD PRESSURE: 70 MMHG | WEIGHT: 170.25 LBS

## 2024-07-01 DIAGNOSIS — Z3A.30 30 WEEKS GESTATION OF PREGNANCY (HHS-HCC): Primary | ICD-10-CM

## 2024-07-01 DIAGNOSIS — O36.8390 FETAL ARRHYTHMIA AFFECTING PREGNANCY, ANTEPARTUM (HHS-HCC): ICD-10-CM

## 2024-07-01 DIAGNOSIS — Z34.83 NORMAL PREGNANCY IN MULTIGRAVIDA IN THIRD TRIMESTER (HHS-HCC): ICD-10-CM

## 2024-07-01 DIAGNOSIS — Z98.891 HISTORY OF PRIMARY CESAREAN SECTION: ICD-10-CM

## 2024-07-01 PROCEDURE — 0501F PRENATAL FLOW SHEET: CPT | Performed by: ADVANCED PRACTICE MIDWIFE

## 2024-07-01 NOTE — PROGRESS NOTES
Return OB visit    S: Talia Talavera is a 34 y.o.  at 30w5d with a working estimated date of delivery of 2024, by Last Menstrual Period who presents for a routine prenatal visit. She denies vaginal bleeding, abdominal pain, leakage of fluid.     Concerns today: Patient has no questions or concerns today.    DARLENE NAVARRETE MA      O: See prenatal flow sheet    A/P:  Glucose screening -- chooses physician care  Lab- needs CBC and prenatal labs  Reviewed warning signs and when to call midwife  Follow up in 2 weeks for a routine prenatal visit    Next visit review birth preferences  Transfer to physician care for declining GDM screen  Needs TOLAC Consent; obtain surgical report

## 2024-07-10 ENCOUNTER — APPOINTMENT (OUTPATIENT)
Dept: PEDIATRIC CARDIOLOGY | Facility: CLINIC | Age: 34
End: 2024-07-10
Payer: COMMERCIAL

## 2024-07-10 VITALS
WEIGHT: 173.94 LBS | DIASTOLIC BLOOD PRESSURE: 69 MMHG | OXYGEN SATURATION: 97 % | BODY MASS INDEX: 29.7 KG/M2 | HEART RATE: 81 BPM | TEMPERATURE: 97.9 F | HEIGHT: 64 IN | SYSTOLIC BLOOD PRESSURE: 110 MMHG

## 2024-07-10 DIAGNOSIS — O35.BXX0 ABNORMAL FETAL ECHOCARDIOGRAPHY AFFECTING ANTEPARTUM CARE OF MOTHER, SINGLE OR UNSPECIFIED FETUS (HHS-HCC): Primary | ICD-10-CM

## 2024-07-10 DIAGNOSIS — O35.8XX0 MATERNAL CARE FOR OTHER (SUSPECTED) FETAL ABNORMALITY AND DAMAGE, NOT APPLICABLE OR UNSPECIFIED (HHS-HCC): ICD-10-CM

## 2024-07-10 DIAGNOSIS — O35.BXX0 ABNORMAL FETAL ECHOCARDIOGRAPHY AFFECTING ANTEPARTUM CARE OF MOTHER, SINGLE OR UNSPECIFIED FETUS (HHS-HCC): ICD-10-CM

## 2024-07-10 PROCEDURE — 76825 ECHO EXAM OF FETAL HEART: CPT | Performed by: PEDIATRICS

## 2024-07-10 PROCEDURE — 1036F TOBACCO NON-USER: CPT | Performed by: PEDIATRICS

## 2024-07-10 PROCEDURE — 93325 DOPPLER ECHO COLOR FLOW MAPG: CPT | Performed by: PEDIATRICS

## 2024-07-10 PROCEDURE — 99214 OFFICE O/P EST MOD 30 MIN: CPT | Performed by: PEDIATRICS

## 2024-07-10 PROCEDURE — 76827 ECHO EXAM OF FETAL HEART: CPT | Performed by: PEDIATRICS

## 2024-07-10 NOTE — PROGRESS NOTES
"Talia Talavera was seen at the request of Lester Duncan MD for a chief complaint of persistent PAC's; a report with my findings is being sent via written or electronic means the referring physician with my recommendations for treatment.     I had the pleasure of seeing Talia Talavera in Pediatric Cardiology consultation at our [place] location as part of our prenatal heart program for follow up of a fetal echocardiogram on 5/15/24 that showed frequent PAC's with frequent bigeminy and trigeminy. She was originally referred for PAC's that were first found on obstetric ultrasound on 24. Ms. Talavera had a follow up ultrasound on 4/15/24 that showed persistent PAC's and \"FHR had a few beats that were less than 110.\" She was last seen by my colleague Dr. Walter on 2024 at which time showed the fetal heart rate was within normal limits without any premature atrial contractions. She is a 34 y.o. year-old  woman, currently 32w0d weeks gestation. Patient's last menstrual period was 2023.  Estimated Date of Delivery: 24.  There have been no pregnancy complications.   She has not been hospitalized during this pregnancy.  She declined aneuploidy testing.  She had a second trimester ultrasound which showed PAC's and brief episodes of FHR <110. She has been checking the FHR about twice daily at home and states that HR's have been mostly 130's-150's.     Prior to the visit, I personally reviewed the cardiac portions of the obstetrical ultrasound performed on 4/15/24.  There is normal segmental anatomy with normal 4 chamber, outflow tract and 3 vessel views.  There is no evidence of septation defect, right or left ventricular outflow obstruction or significant valvular regurgitation.  There were multiple episodes of ectopy with premature atrial contractions.  Some 2D images are suggestive of couplets.    Her previous obstetrical history is significant for 1 full term delivery.  Her past " "medical history is without complication.  She has no history of congenital heart disease, arrhythmia, cardiomyopathy, hypercholesterolemia, hypertension, diabetes, rheumatic heart disease, cancer, asthma, lupus, Sjogren syndrome, clotting disorder, depression, anxiety, alcohol abuse, phenylketonuria, or DiGeorge.  She has had a .  She takes vitamin D.  She has No Known Allergies.  She is currently taking prenatal vitamins.      Her family history is negative for congenital heart disease, early atherosclerosis, sudden cardiac death, long QT syndrome, cardiomyopathy, aortic aneurysm, or genetic or metabolic disease.      She currently lives with her spouse and daughter and is .  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  She denies illicit drug use or alcohol abuse.  She denies verbal, sexual, or physical abuse.     Delivery Hospital: [facility]  Father of the baby's name: Rex    /69 (BP Location: Right arm, Patient Position: Sitting)   Pulse 81   Temp 36.6 °C (97.9 °F)   Ht 1.62 m (5' 3.78\")   Wt 78.9 kg (173 lb 15.1 oz)   LMP 2023   SpO2 97%   BMI 30.06 kg/m²     She was resting comfortably in the examination room and alert, active and in no respiratory distress. Skin was without rash.  HEENT: moist mucous membranes, no JVD, goiter. Breathing is not labored.  She was acyanotic.  There was no peripheral edema.   The abdomen was gravid, soft, nontender with normal bowel sounds.  The liver was not palpable.  The spleen tip was not palpable.  She had a normal gait and normal strength in all extremities.  Cranial nerves II - XII are intact.  She had no clubbing, cyanosis, or edema.    A two-dimensional and Doppler fetal echocardiogram was performed today and interpreted by me at 26w0d weeks gestation.  The fetal echocardiogram showed normal segmental anatomy with no structural abnormalities found.  There is normal cardiac function.  There is no evidence of septation " defect, right or left ventricular outflow obstruction or significant valvular regurgitation.  The fetal heart rate was within normal limits with frequent premature atrial contractions. The spectral Doppler pattern across all valves, venous structures, and arterial structures was within normal limits.  There is no pericardial effusion.  Please see full report for details.    In summary, Talia Talavera is a 34 y.o. year-old  woman, currently 32w0d weeks gestation, who had a fetal echocardiogram that demonstrated frequent premature atrial contractions (PACs).  Previously, she has had fetal echoes were we saw occasional episodes of atrial bigeminy and triplets.  Today, were no ectopic beats or sustained tachycardia.  Her previous fetal echocardiogram demonstrated episodes of tachycardia with heart rates in the 200 range.  The episodes were self limited.   There was no sustained bradycardia. There was no evidence of ventricular or atrial dilatation and no effusions noted. Premature atrial beats are common findings (1-3% of pregnancies) and generally well tolerated unless sustained tachycardia develops. Intermittent fetal tachycardia (defined as less than 50% of scanning time) is also generally well tolerated.  It does increase the risk for sustained tachycardia.  No intervention recommended at this time. We provided Talia with a Doppler to check heart rates at home with instructions of checking twice per day.  She will call us if the heart rate is above 180 bpm.  My recommendations are to recheck in 20 minutes if that is the case.  If there is still tachycardia at 20 minutes, she was instructed to go to [facility] triage for long term monitoring.  The risk of fetal tachycardia is increased given the intermittent periods of tachycardia.  Therefore, I did recommend a follow up scan in 2 weeks.  At this time, we did not make any changes to delivery plan.      LOC 1  This cardiac anomaly is not  expected to cause hemodynamic instability in the  period. No changes were made to current delivery plan.  Triage code 1: Delivery per OB at patient's preferred hospital.  Standard  care per  team.  Cardiology evaluation prior to discharge if born at [facility] or as an outpatient within 1-2 weeks if born at an outside facility.      Thank you for allowing me to participate in Talia's care.  If you have any further questions, please do not hesitate to contact me.     Baron Abreu M.D.  [facility], Director  Ambulatory Pediatric Cardiology   Division of Pediatric Cardiology  [facility]  [facility]   of Pediatrics, [facility] [employer]  [facility] -   [address]  [place] [id number]  Office:  [phone]  Fax:       [phone]  e-mail:  [email or web address]

## 2024-07-10 NOTE — LETTER
"July 10, 2024     Lester Duncan MD  [address]  Ob/Gyn  [facility] [address]    Patient: Talia Talavera   YOB: 1990   Date of Visit: 7/10/2024       Dear Dr. Lester Duncan MD:    Thank you for referring Talia Talavera to me for evaluation. Below are my notes for this consultation.  If you have questions, please do not hesitate to call me. I look forward to following your patient along with you.       Sincerely,     Baron Abreu MD      CC: MD Amy Umanzor, APRN-CN  Alesia Khan, APRN-[facility], RASHID Barbosa, APRN-CNP  ______________________________________________________________________________________    Talia Talavera was seen at the request of Lester Dunacn MD for a chief complaint of persistent PAC's; a report with my findings is being sent via written or electronic means the referring physician with my recommendations for treatment.     I had the pleasure of seeing Talia Talavera in Pediatric Cardiology consultation at our [place] location as part of our prenatal heart program for follow up of a fetal echocardiogram on 5/15/24 that showed frequent PAC's with frequent bigeminy and trigeminy. She was originally referred for PAC's that were first found on obstetric ultrasound on 24. Ms. Talavera had a follow up ultrasound on 4/15/24 that showed persistent PAC's and \"FHR had a few beats that were less than 110.\" She was last seen by my colleague Dr. Walter on 2024 at which time showed the fetal heart rate was within normal limits without any premature atrial contractions. She is a 34 y.o. year-old  woman, currently 32w0d weeks gestation. Patient's last menstrual period was 2023.  Estimated Date of Delivery: 24.  There have been no pregnancy complications.   She has not been hospitalized during this pregnancy.  She declined aneuploidy testing.  She had a second trimester ultrasound which showed PAC's and brief " "episodes of FHR <110. She has been checking the FHR about twice daily at home and states that HR's have been mostly 130's-150's.     Prior to the visit, I personally reviewed the cardiac portions of the obstetrical ultrasound performed on 4/15/24.  There is normal segmental anatomy with normal 4 chamber, outflow tract and 3 vessel views.  There is no evidence of septation defect, right or left ventricular outflow obstruction or significant valvular regurgitation.  There were multiple episodes of ectopy with premature atrial contractions.  Some 2D images are suggestive of couplets.    Her previous obstetrical history is significant for 1 full term delivery.  Her past medical history is without complication.  She has no history of congenital heart disease, arrhythmia, cardiomyopathy, hypercholesterolemia, hypertension, diabetes, rheumatic heart disease, cancer, asthma, lupus, Sjogren syndrome, clotting disorder, depression, anxiety, alcohol abuse, phenylketonuria, or DiGeorge.  She has had a .  She takes vitamin D.  She has No Known Allergies.  She is currently taking prenatal vitamins.      Her family history is negative for congenital heart disease, early atherosclerosis, sudden cardiac death, long QT syndrome, cardiomyopathy, aortic aneurysm, or genetic or metabolic disease.      She currently lives with her spouse and daughter and is .  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  She denies illicit drug use or alcohol abuse.  She denies verbal, sexual, or physical abuse.     Delivery Hospital: [facility]  Father of the baby's name: Rex    /69 (BP Location: Right arm, Patient Position: Sitting)   Pulse 81   Temp 36.6 °C (97.9 °F)   Ht 1.62 m (5' 3.78\")   Wt 78.9 kg (173 lb 15.1 oz)   LMP 2023   SpO2 97%   BMI 30.06 kg/m²     She was resting comfortably in the examination room and alert, active and in no respiratory distress. Skin was without rash.  HEENT: moist " mucous membranes, no JVD, goiter. Breathing is not labored.  She was acyanotic.  There was no peripheral edema.   The abdomen was gravid, soft, nontender with normal bowel sounds.  The liver was not palpable.  The spleen tip was not palpable.  She had a normal gait and normal strength in all extremities.  Cranial nerves II - XII are intact.  She had no clubbing, cyanosis, or edema.    A two-dimensional and Doppler fetal echocardiogram was performed today and interpreted by me at 26w0d weeks gestation.  The fetal echocardiogram showed normal segmental anatomy with no structural abnormalities found.  There is normal cardiac function.  There is no evidence of septation defect, right or left ventricular outflow obstruction or significant valvular regurgitation.  The fetal heart rate was within normal limits with frequent premature atrial contractions. The spectral Doppler pattern across all valves, venous structures, and arterial structures was within normal limits.  There is no pericardial effusion.  Please see full report for details.    In summary, Talia Talavera is a 34 y.o. year-old  woman, currently 32w0d weeks gestation, who had a fetal echocardiogram that demonstrated frequent premature atrial contractions (PACs).  Previously, she has had fetal echoes were we saw occasional episodes of atrial bigeminy and triplets.  Today, were no ectopic beats or sustained tachycardia.  Her previous fetal echocardiogram demonstrated episodes of tachycardia with heart rates in the 200 range.  The episodes were self limited.   There was no sustained bradycardia. There was no evidence of ventricular or atrial dilatation and no effusions noted. Premature atrial beats are common findings (1-3% of pregnancies) and generally well tolerated unless sustained tachycardia develops. Intermittent fetal tachycardia (defined as less than 50% of scanning time) is also generally well tolerated.  It does increase the risk for sustained  tachycardia.  No intervention recommended at this time. We provided Talia with a Doppler to check heart rates at home with instructions of checking twice per day.  She will call us if the heart rate is above 180 bpm.  My recommendations are to recheck in 20 minutes if that is the case.  If there is still tachycardia at 20 minutes, she was instructed to go to [facility] triage for long term monitoring.  The risk of fetal tachycardia is increased given the intermittent periods of tachycardia.  Therefore, I did recommend a follow up scan in 2 weeks.  At this time, we did not make any changes to delivery plan.      LOC 1  This cardiac anomaly is not expected to cause hemodynamic instability in the  period. No changes were made to current delivery plan.  Triage code 1: Delivery per OB at patient's preferred hospital.  Standard  care per  team.  Cardiology evaluation prior to discharge if born at [facility] or as an outpatient within 1-2 weeks if born at an outside facility.      Thank you for allowing me to participate in Talia's care.  If you have any further questions, please do not hesitate to contact me.     Baron Abreu M.D.  [facility]etal [facility] Center, Director  Ambulatory Pediatric Cardiology   Division of Pediatric Cardiology  [facility]  [facility]   of Pediatrics, [facility] [employer] [facility]  [facility] -   [address]  [place] [address]  Office:  [phone]  Fax:       [phone]  e-mail:  [email or web address]

## 2024-07-10 NOTE — PATIENT INSTRUCTIONS
Your fetus has had skipped heart beats.  The mostly consisted of premature atrial contractions, or PACs for short.  On some echocardiograms, we saw brief episodes of fast heart beats (intermittent tachycardia).  We do not treat fetal PACs or intermittent tachycardia.  At this time, I would like you to come back in 2 weeks for a repeat fetal echocardiogram.  Continue monitoring the heart rate at home.  If the heart rate is more than 180 beats per minute recheck in 20 minutes.  If still greater than 180, come to triage at [facility].  At this point, we don't need change your delivery plan.    If your pediatrician hears PACs after delivery, they can order an ECG and schedule a referral with a pediatric cardiologist.      Sometimes it is not possible to see all the heart structures because of the position or size of the fetus. This does not mean they are not there, but may mean that for technical reasons they cannot be assessed. Sometimes this information may not be important; while in some cases it means that definite answers are not possible. The echocardiographer will discuss this with you if necessary, and repeat studies are frequently performed later in the pregnancy.     Certain congenital heart abnormalities are also hard to detect by fetal echocardiograms, but often these are simple abnormalities. We do not mention this to concern you, but rather that you understand that there are technical limitations to such studies. It remains important that your pediatrician provides a normal careful medical examination of the baby (including the heart) takes place after birth, and if there were any suspicious cardiac findings, that these are evaluated in the usual way irrespective of the findings at fetal study.     Certain communications between the two sides of the circulation are normally present in all developing babies and normally close after birth. We are not able to tell in advance whether this  will occur, however there is only a tiny chance that they will not. Persistence of these structures is generally not a difficult problem to deal with.     We direct our attention only to the heart, where we have special expertise. This is not the same as your general obstetric ultrasound scan. Other ultrasound information about the fetus can be obtained from an obstetric ultrasonographer or your obstetrician.

## 2024-07-16 ENCOUNTER — APPOINTMENT (OUTPATIENT)
Dept: OBSTETRICS AND GYNECOLOGY | Facility: CLINIC | Age: 34
End: 2024-07-16
Payer: COMMERCIAL

## 2024-07-16 VITALS — BODY MASS INDEX: 30.49 KG/M2 | SYSTOLIC BLOOD PRESSURE: 93 MMHG | DIASTOLIC BLOOD PRESSURE: 60 MMHG | WEIGHT: 176.4 LBS

## 2024-07-16 DIAGNOSIS — Z3A.32 32 WEEKS GESTATION OF PREGNANCY (HHS-HCC): ICD-10-CM

## 2024-07-16 DIAGNOSIS — Z34.81 NORMAL PREGNANCY IN MULTIGRAVIDA IN FIRST TRIMESTER (HHS-HCC): ICD-10-CM

## 2024-07-16 DIAGNOSIS — Z98.891 HISTORY OF PRIMARY CESAREAN SECTION: ICD-10-CM

## 2024-07-16 DIAGNOSIS — O36.8390 FETAL ARRHYTHMIA AFFECTING PREGNANCY, ANTEPARTUM (HHS-HCC): Primary | ICD-10-CM

## 2024-07-16 PROBLEM — O35.BXX0 ABNORMAL FETAL ECHOCARDIOGRAM AFFECTING ANTEPARTUM CARE OF MOTHER (HHS-HCC): Status: RESOLVED | Noted: 2024-07-10 | Resolved: 2024-07-16

## 2024-07-16 LAB
ERYTHROCYTE [DISTWIDTH] IN BLOOD BY AUTOMATED COUNT: 12.9 % (ref 11.5–14.5)
HCT VFR BLD AUTO: 37.4 % (ref 36–46)
HGB BLD-MCNC: 11.6 G/DL (ref 12–16)
MCH RBC QN AUTO: 29.3 PG (ref 26–34)
MCHC RBC AUTO-ENTMCNC: 31 G/DL (ref 32–36)
MCV RBC AUTO: 94 FL (ref 80–100)
NRBC BLD-RTO: 0 /100 WBCS (ref 0–0)
PLATELET # BLD AUTO: 176 X10*3/UL (ref 150–450)
RBC # BLD AUTO: 3.96 X10*6/UL (ref 4–5.2)
WBC # BLD AUTO: 11.9 X10*3/UL (ref 4.4–11.3)

## 2024-07-16 PROCEDURE — 85027 COMPLETE CBC AUTOMATED: CPT

## 2024-07-16 PROCEDURE — 0501F PRENATAL FLOW SHEET: CPT | Performed by: STUDENT IN AN ORGANIZED HEALTH CARE EDUCATION/TRAINING PROGRAM

## 2024-07-16 PROCEDURE — 36415 COLL VENOUS BLD VENIPUNCTURE: CPT

## 2024-07-16 NOTE — PROGRESS NOTES
Subjective   Patient ID [id number]   Talia Talavera is a 34 y.o.  at 32w6d with a working estimated date of delivery of 2024, by Last Menstrual Period who presents for a routine prenatal visit. Good FM, no OB complaints.    Objective   Physical Exam  Vitals:    24 1604   BP: 93/60      Weight: 80 kg (176 lb 6.4 oz), Pregravid BMI: 25.06  Expected Total Weight Gain: 7 kg (15 lb)-11.5 kg (25 lb)   Fundal height and FHR per prenatal flowsheet    Assessment/Plan     Talia Talavera is a 34 y.o.  at 32w6d with a working estimated date of delivery of 2024, by Last Menstrual Period who presents for a routine prenatal visit.    Declines tdap. Has not completed GDM screening. Does not want to do glucola. Was not aware she could still do POCTs. Is agreeable to POCTs x1 week. Will complete prior to next visit. To send CBC with reflex today. Had PNLs drawn through [employer] in first trimester. Has results at home - will send in through [employer]. Declines to have redrawn today due to cost concerns. History of pLTCS for arrest of descent of 7 lb 10 oz OP baby after 4 hours of pushing. Op report reviewed, confirmed low transverse. MFMU score = 54%. Was already recommended for rCS by Dr. Gearrd however she strongly declines and desires TOLAC. Reviewed r/b/a at length including risk of uterine rupture. Consent signed. Does consent to blood transfusion if needed. Also aware of need for IV access and CEFM in labor. Discussed that epidural is not required but reviewed risk of GETA in case of emergency. Also discussed recommendation for IOL by 40.6wga if planning to TOLAC to which patient is agreeable. Did offer 36 week growth US but states would not change her mind and declines this US. Following with peds cards for fetal PACs as below.  today without audible arrhythmia. Remainder of plan per problem list as below.     Problem List Items Addressed This Visit       Normal pregnancy in multigravida in first  trimester ([facility])    Overview     *Transferred to physician care at 32 weeks for lack of GDM screening*  -Declines flu and COVID vaccination  -Declines tdap    Next Visit  [ ] Review POCTs (for GDM screening)         History of primary  section    Overview     -pLTCS for arrest of descent of 7 lb 10 oz OP baby after 4 hours of pushing, op report reviewed, confirmed low transverse  -MFMU score =54.4%  -Desires TOLAC, consent signed 24         Fetal arrhythmia affecting pregnancy, antepartum ([facility]) - Primary    Overview     -Fetal echo with frequent PACs, trigeminy, and bigeminy  -Following with peds cardiology, for q2 week fetal echos and twice daily home dopplers -> if FHR > 180bpm, repeat in 20 minutes. If sustained, to present to [facility] for prolonged monitoring  -Triage LOC 1: delivery at patient's preferred hospital with Cardiology evaluation prior to discharge if born at [facility] or as an outpatient within 1-2 weeks if born at an outside facility          Other Visit Diagnoses       32 weeks gestation of pregnancy ([facility])        Relevant Orders    CBC Anemia Panel With Reflex, Pregnancy          Follow up in 2 weeks for a routine prenatal visit.    Caitlyn Carbajal MD

## 2024-07-17 DIAGNOSIS — Z13.1 SCREENING FOR DIABETES MELLITUS: Primary | ICD-10-CM

## 2024-07-17 LAB — REFLEX ADDED, ANEMIA PANEL: NORMAL

## 2024-07-17 RX ORDER — LANCETS 28 GAUGE
EACH MISCELLANEOUS
Qty: 28 EACH | Refills: 0 | Status: SHIPPED | OUTPATIENT
Start: 2024-07-17 | End: 2025-07-17

## 2024-07-17 RX ORDER — ISOPROPYL ALCOHOL 70 ML/100ML
SWAB TOPICAL
Qty: 200 EACH | Refills: 0 | Status: SHIPPED | OUTPATIENT
Start: 2024-07-17

## 2024-07-19 ENCOUNTER — DOCUMENTATION (OUTPATIENT)
Dept: OBSTETRICS AND GYNECOLOGY | Facility: CLINIC | Age: 34
End: 2024-07-19
Payer: COMMERCIAL

## 2024-07-24 ENCOUNTER — APPOINTMENT (OUTPATIENT)
Dept: PEDIATRIC CARDIOLOGY | Facility: CLINIC | Age: 34
End: 2024-07-24
Payer: COMMERCIAL

## 2024-07-24 DIAGNOSIS — O35.BXX0 ABNORMAL FETAL ECHOCARDIOGRAPHY AFFECTING ANTEPARTUM CARE OF MOTHER, SINGLE OR UNSPECIFIED FETUS (HHS-HCC): Primary | ICD-10-CM

## 2024-08-02 ENCOUNTER — APPOINTMENT (OUTPATIENT)
Dept: OBSTETRICS AND GYNECOLOGY | Facility: CLINIC | Age: 34
End: 2024-08-02
Payer: COMMERCIAL

## 2024-08-02 VITALS — BODY MASS INDEX: 30.76 KG/M2 | DIASTOLIC BLOOD PRESSURE: 72 MMHG | SYSTOLIC BLOOD PRESSURE: 118 MMHG | WEIGHT: 178 LBS

## 2024-08-02 DIAGNOSIS — Z98.891 HISTORY OF PRIMARY CESAREAN SECTION: ICD-10-CM

## 2024-08-02 DIAGNOSIS — Z3A.35 35 WEEKS GESTATION OF PREGNANCY (HHS-HCC): ICD-10-CM

## 2024-08-02 DIAGNOSIS — O36.8390 FETAL ARRHYTHMIA AFFECTING PREGNANCY, ANTEPARTUM (HHS-HCC): Primary | ICD-10-CM

## 2024-08-02 DIAGNOSIS — Z34.81 NORMAL PREGNANCY IN MULTIGRAVIDA IN FIRST TRIMESTER (HHS-HCC): ICD-10-CM

## 2024-08-02 NOTE — PROGRESS NOTES
Subjective   Patient ID [id number]   Talia Talavera is a 34 y.o.  at 35w2d with a working estimated date of delivery of 2024, by Last Menstrual Period who presents for a routine prenatal visit. Good FM, no OB complaints.  Glucose fasting some elevated but were not really fasted, pp within range    Objective   Physical Exam  Vitals:    24 1527   BP: 118/72      Weight: 80.7 kg (178 lb), Pregravid BMI: 25.06  Expected Total Weight Gain: 7 kg (15 lb)-11.5 kg (25 lb)   Fundal height and FHR per prenatal flowsheet    Assessment/Plan     Talia Talavera is a 34 y.o.  at 35w2d with a working estimated date of delivery of 2024, by Last Menstrual Period who presents for a routine prenatal visit.     Remainder of plan per problem list as below.     Problem List Items Addressed This Visit       Normal pregnancy in multigravida in first trimester ([facility])    Overview     *Transferred to physician care at 32 weeks for lack of GDM screening*  *PNLs completed 24 at [facility] - scanned into Media tab*  -Declines flu and COVID vaccination  -Declines tdap  -fasting glucose elevated but were not truly fasting otherwise values in range, continues to decline growth scan    Next Visit  [ ] GBS         History of primary  section    Overview     -pLTCS for arrest of descent of 7 lb 10 oz OP baby after 4 hours of pushing, op report reviewed, confirmed low transverse  -MFMU score =54.4%  -Desires TOLAC, consent signed 24         Fetal arrhythmia affecting pregnancy, antepartum ([facility]) - Primary    Overview     -Fetal echo with frequent PACs, trigeminy, and bigeminy  -Following with peds cardiology, for q2 week fetal echos and twice daily home dopplers -> if FHR > 180bpm, repeat in 20 minutes. If sustained, to present to [facility] for prolonged monitoring  -Triage LOC 1: delivery at patient's preferred hospital with Cardiology evaluation prior to discharge if [facility] at [facility] or as an outpatient within 1-2 weeks if born at an outside facility            Follow up in 1 week for a routine prenatal visit.    Alesia Estrada MD

## 2024-08-07 ENCOUNTER — APPOINTMENT (OUTPATIENT)
Dept: PEDIATRIC CARDIOLOGY | Facility: CLINIC | Age: 34
End: 2024-08-07
Payer: COMMERCIAL

## 2024-08-07 VITALS
SYSTOLIC BLOOD PRESSURE: 106 MMHG | WEIGHT: 180.78 LBS | OXYGEN SATURATION: 98 % | BODY MASS INDEX: 32.03 KG/M2 | DIASTOLIC BLOOD PRESSURE: 66 MMHG | HEIGHT: 63 IN | TEMPERATURE: 97.6 F | HEART RATE: 89 BPM

## 2024-08-07 DIAGNOSIS — O35.8XX0 MATERNAL CARE FOR OTHER (SUSPECTED) FETAL ABNORMALITY AND DAMAGE, NOT APPLICABLE OR UNSPECIFIED (HHS-HCC): ICD-10-CM

## 2024-08-07 DIAGNOSIS — O35.BXX0 ABNORMAL FETAL ECHOCARDIOGRAPHY AFFECTING ANTEPARTUM CARE OF MOTHER, SINGLE OR UNSPECIFIED FETUS (HHS-HCC): Primary | ICD-10-CM

## 2024-08-07 DIAGNOSIS — O35.BXX0 ABNORMAL FETAL ECHOCARDIOGRAPHY AFFECTING ANTEPARTUM CARE OF MOTHER, SINGLE OR UNSPECIFIED FETUS (HHS-HCC): ICD-10-CM

## 2024-08-07 PROCEDURE — 76825 ECHO EXAM OF FETAL HEART: CPT | Performed by: PEDIATRICS

## 2024-08-07 PROCEDURE — 76827 ECHO EXAM OF FETAL HEART: CPT | Performed by: PEDIATRICS

## 2024-08-07 PROCEDURE — 99215 OFFICE O/P EST HI 40 MIN: CPT | Performed by: PEDIATRICS

## 2024-08-07 PROCEDURE — 3008F BODY MASS INDEX DOCD: CPT | Performed by: PEDIATRICS

## 2024-08-07 PROCEDURE — 93325 DOPPLER ECHO COLOR FLOW MAPG: CPT | Performed by: PEDIATRICS

## 2024-08-07 NOTE — PROGRESS NOTES
"Talia Talavera was seen at the request of Lester Duncan MD for a chief complaint of persistent PAC's; a report with my findings is being sent via written or electronic means the referring physician with my recommendations for treatment.     I had the pleasure of seeing Talia Talavera in Pediatric Cardiology consultation at our [place] location as part of our prenatal heart program for follow up of a fetal echocardiogram on 7/10/24 that showed frequent PAC's. She was originally referred for PAC's that were first found on obstetric ultrasound on 24. Ms. Talavera had a follow up ultrasound on 4/15/24 that showed persistent PAC's and \"FHR had a few beats that were less than 110.\" She is a 34 y.o. year-old  woman, currently 35w6d weeks gestation. Patient's last menstrual period was 2023.  Estimated Date of Delivery: 24.  There have been no pregnancy complications.   She has not been hospitalized during this pregnancy.  She declined aneuploidy testing.  She had a second trimester ultrasound which showed PAC's and brief episodes of FHR <110. She has been checking the FHR 1-2x daily at home and states that HR's have been mostly 130's-150's.     Prior to the visit, I personally reviewed the cardiac portions of the obstetrical ultrasound performed on 4/15/24.  There is normal segmental anatomy with normal 4 chamber, outflow tract and 3 vessel views.  There is no evidence of septation defect, right or left ventricular outflow obstruction or significant valvular regurgitation.  There were multiple episodes of ectopy with premature atrial contractions.  Some 2D images are suggestive of couplets.    Her previous obstetrical history is significant for 1 full term delivery.  Her past medical history is without complication.  She has no history of congenital heart disease, arrhythmia, cardiomyopathy, hypercholesterolemia, hypertension, diabetes, rheumatic heart disease, cancer, asthma, lupus, " "Sjogren syndrome, clotting disorder, depression, anxiety, alcohol abuse, phenylketonuria, or DiGeorge.  She has had a .  She takes vitamin D.  She has No Known Allergies.  She is currently taking prenatal vitamins.      Her family history is negative for congenital heart disease, early atherosclerosis, sudden cardiac death, long QT syndrome, cardiomyopathy, aortic aneurysm, or genetic or metabolic disease.      She currently lives with her spouse and daughter and is .  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  She denies illicit drug use or alcohol abuse.  She denies verbal, sexual, or physical abuse.     Delivery Hospital: [facility] [place]  Father of the baby's name: Rex    /66 (BP Location: Right arm, Patient Position: Sitting)   Pulse 89   Temp 36.4 °C (97.6 °F)   Ht 1.61 m (5' 3.39\")   Wt 82 kg (180 lb 12.4 oz)   LMP 2023   SpO2 98%   BMI 31.63 kg/m²     She was resting comfortably in the examination room and alert, active and in no respiratory distress. Skin was without rash.  HEENT: moist mucous membranes, no JVD, goiter. Breathing is not labored.  She was acyanotic.  There was no peripheral edema.   The abdomen was gravid, soft, nontender with normal bowel sounds.  The liver was not palpable.  The spleen tip was not palpable.  She had a normal gait and normal strength in all extremities.  Cranial nerves II - XII are intact.  She had no clubbing, cyanosis, or edema.    A two-dimensional and Doppler fetal echocardiogram was performed today and interpreted by me at 35w6d weeks gestation.  The fetal echocardiogram showed normal segmental anatomy with no structural abnormalities found.  There is normal cardiac function.  There is no evidence of septation defect, right or left ventricular outflow obstruction or significant valvular regurgitation.  The fetal heart rate was within normal limits with frequent premature atrial contractions. The spectral Doppler pattern " across all valves, venous structures, and arterial structures was within normal limits.  There is no pericardial effusion.  Please see full report for details.    In summary, Talia Talavera is a 34 y.o. year-old  woman, currently 35w6d weeks gestation, who had a fetal echocardiogram that demonstrated frequent premature atrial contractions (PACs).  Previously, she has had fetal echoes were we saw occasional episodes of atrial bigeminy and triplets.  Today, were no ectopic beats or sustained tachycardia.  Her previous fetal echocardiogram demonstrated episodes of tachycardia with heart rates in the 200 range.  The episodes were self limited.   There was no sustained bradycardia. There was no evidence of ventricular or atrial dilatation and no effusions noted. Premature atrial beats are common findings (1-3% of pregnancies) and generally well tolerated unless sustained tachycardia develops. Intermittent fetal tachycardia (defined as less than 50% of scanning time) is also generally well tolerated.  It does increase the risk for sustained tachycardia.  No intervention recommended at this time. We provided Talia with a Doppler to check heart rates at home with instructions of checking twice per day.  She will call us if the heart rate is above 180 bpm.  My recommendations are to recheck in 20 minutes if that is the case.  If there is still tachycardia at 20 minutes, she was instructed to go to [facility] triage for long term monitoring.  The risk of fetal tachycardia is increased given the intermittent periods of tachycardia.  Therefore, I did recommend a follow up scan in 2 weeks.  At this time, we did not make any changes to delivery plan.      LOC 1  This cardiac anomaly is not expected to cause hemodynamic instability in the  period. No changes were made to current delivery plan.  Triage code 1: Delivery per OB at patient's preferred hospital.  Standard  care per   team.  Cardiology evaluation prior to discharge if born at [facility] or as an outpatient within 1-2 weeks if born at an outside facility.      Thank you for allowing me to participate in Talia's care.  If you have any further questions, please do not hesitate to contact me.     Baron Abreu M.D.  [facility], Director  Ambulatory Pediatric Cardiology   Division of Pediatric Cardiology  [facility]  [facility]   of Pediatrics, [employer]  [facility] [address]  [place] [id number]  Office:  [phone]  Fax:       [phone]  e-mail:  [email or web address]

## 2024-08-07 NOTE — PATIENT INSTRUCTIONS
We did not see any PAC's today. On previous fetal echocardiograms, we saw brief episodes of fast heart beats (intermittent tachycardia).  We do not treat fetal PACs or intermittent tachycardia.  Continue monitoring the heart rate at home.  If the heart rate is more than 180 beats per minute recheck in 20 minutes.  If still greater than 180, come to triage at [facility].  At this point, we don't need change your delivery plan.    If your pediatrician hears PACs after delivery, they can order an ECG and schedule a referral with a pediatric cardiologist.      Sometimes it is not possible to see all the heart structures because of the position or size of the fetus. This does not mean they are not there, but may mean that for technical reasons they cannot be assessed. Sometimes this information may not be important; while in some cases it means that definite answers are not possible. The echocardiographer will discuss this with you if necessary, and repeat studies are frequently performed later in the pregnancy.     Certain congenital heart abnormalities are also hard to detect by fetal echocardiograms, but often these are simple abnormalities. We do not mention this to concern you, but rather that you understand that there are technical limitations to such studies. It remains important that your pediatrician provides a normal careful medical examination of the baby (including the heart) takes place after birth, and if there were any suspicious cardiac findings, that these are evaluated in the usual way irrespective of the findings at fetal study.     Certain communications between the two sides of the circulation are normally present in all developing babies and normally close after birth. We are not able to tell in advance whether this will occur, however there is only a tiny chance that they will not. Persistence of these structures is generally not a difficult problem to deal with.     We direct  our attention only to the heart, where we have special expertise. This is not the same as your general obstetric ultrasound scan. Other ultrasound information about the fetus can be obtained from an obstetric ultrasonographer or your obstetrician.

## 2024-08-13 ENCOUNTER — APPOINTMENT (OUTPATIENT)
Dept: OBSTETRICS AND GYNECOLOGY | Facility: CLINIC | Age: 34
End: 2024-08-13
Payer: COMMERCIAL

## 2024-08-14 ENCOUNTER — APPOINTMENT (OUTPATIENT)
Dept: OBSTETRICS AND GYNECOLOGY | Facility: CLINIC | Age: 34
End: 2024-08-14
Payer: COMMERCIAL

## 2024-08-15 ENCOUNTER — ROUTINE PRENATAL (OUTPATIENT)
Dept: OBSTETRICS AND GYNECOLOGY | Facility: CLINIC | Age: 34
End: 2024-08-15
Payer: COMMERCIAL

## 2024-08-15 ENCOUNTER — PREP FOR PROCEDURE (OUTPATIENT)
Dept: OBSTETRICS AND GYNECOLOGY | Facility: HOSPITAL | Age: 34
End: 2024-08-15

## 2024-08-15 VITALS — BODY MASS INDEX: 31.6 KG/M2 | SYSTOLIC BLOOD PRESSURE: 105 MMHG | DIASTOLIC BLOOD PRESSURE: 75 MMHG | WEIGHT: 180.6 LBS

## 2024-08-15 DIAGNOSIS — Z3A.37 37 WEEKS GESTATION OF PREGNANCY (HHS-HCC): ICD-10-CM

## 2024-08-15 DIAGNOSIS — Z98.891 HISTORY OF PRIMARY CESAREAN SECTION: Primary | ICD-10-CM

## 2024-08-15 DIAGNOSIS — Z34.81 NORMAL PREGNANCY IN MULTIGRAVIDA IN FIRST TRIMESTER (HHS-HCC): ICD-10-CM

## 2024-08-15 DIAGNOSIS — Z3A.39 39 WEEKS GESTATION OF PREGNANCY (HHS-HCC): Primary | ICD-10-CM

## 2024-08-15 PROCEDURE — 0501F PRENATAL FLOW SHEET: CPT | Performed by: STUDENT IN AN ORGANIZED HEALTH CARE EDUCATION/TRAINING PROGRAM

## 2024-08-15 PROCEDURE — 87081 CULTURE SCREEN ONLY: CPT

## 2024-08-15 NOTE — PROGRESS NOTES
Subjective   Patient ID [id number]   Talia Talavera is a 34 y.o.  at 37w1d with a working estimated date of delivery of 2024, by Last Menstrual Period who presents for a routine prenatal visit. Good FM, no OB complaints.    Objective   Physical Exam  Vitals:    08/15/24 1004   BP: 105/75      Weight: 81.9 kg (180 lb 9.6 oz), Pregravid BMI: 25.37  Expected Total Weight Gain: 7 kg (15 lb)-11.5 kg (25 lb)   Fundal height and FHR per prenatal flowsheet  BSUS: cephalic, OP    Assessment/Plan     Talia Talavera is a 34 y.o.  at 37w1d with a working estimated date of delivery of 2024, by Last Menstrual Period who presents for a routine prenatal visit.    Has not completed 1 hour GCT. Did one week of POCTs at home and fastings were elevated but were not true fastings. Has repeated a week of fastings but does not have numbers with her today. Requested that she send numbers in ASAP. Reviewed increased risk of stillbirth with undiagnosed GDM. Prior LTCS for arrest of descent of OP baby. BSUS today confirmed cephalic presentation, currently OP. Declines rCS. Desires membrane sweep at 39 weeks and IOL at end of 39th week which was scheduled to day for  at 8pm. GBS collected. SVE closed. Remainder of plan per problem list as below.     Problem List Items Addressed This Visit       Normal pregnancy in multigravida in first trimester ([facility])    Overview     *Transferred to physician care at 32 weeks for lack of GDM screening*  *PNLs completed 24 at [facility] - scanned into Media tab*  -Declines flu and COVID vaccination  -Declines tdap  -fasting glucose elevated but were not truly fasting otherwise values in range, continues to decline growth scan    Next Visit  [ ] Review date/time of IOL  [ ] Review fasting POCTs         History of primary  section - Primary    Overview     -pLTCS for arrest of descent of 7 lb 10 oz OP baby after 4 hours of pushing, op report reviewed,  confirmed low transverse  -MFMU score =54.4%  -Desires TOLAC, consent signed 7/16/24          Other Visit Diagnoses       37 weeks gestation of pregnancy ([facility])        Relevant Orders    Group B Streptococcus (GBS) Prenatal Screen, Culture          Follow up in 1 weeks for a routine prenatal visit.    Caitlyn Carbajal MD

## 2024-08-17 LAB — GP B STREP GENITAL QL CULT: NORMAL

## 2024-08-22 ENCOUNTER — APPOINTMENT (OUTPATIENT)
Dept: OBSTETRICS AND GYNECOLOGY | Facility: CLINIC | Age: 34
End: 2024-08-22
Payer: COMMERCIAL

## 2024-08-22 VITALS — DIASTOLIC BLOOD PRESSURE: 62 MMHG | SYSTOLIC BLOOD PRESSURE: 108 MMHG | WEIGHT: 180.2 LBS | BODY MASS INDEX: 31.53 KG/M2

## 2024-08-22 DIAGNOSIS — Z3A.38 38 WEEKS GESTATION OF PREGNANCY (HHS-HCC): Primary | ICD-10-CM

## 2024-08-22 PROCEDURE — 0501F PRENATAL FLOW SHEET: CPT | Performed by: OBSTETRICS & GYNECOLOGY

## 2024-08-22 NOTE — PROGRESS NOTES
Talia Talavera is a 34 y.o. at 38w1d presents for routine prenatal check.  Pt complains of pressure.    Vitals:    08/22/24 1608   BP: 108/62      Body mass index is 31.53 kg/m².     Plan:  Pt is to F/U in 1wk  Sugars have been better controlled this week  Stripped membranes    Joanna Recinos, DO

## 2024-08-23 ENCOUNTER — HOSPITAL ENCOUNTER (INPATIENT)
Facility: HOSPITAL | Age: 34
LOS: 2 days | Discharge: HOME | End: 2024-08-25
Attending: OBSTETRICS & GYNECOLOGY | Admitting: OBSTETRICS & GYNECOLOGY
Payer: COMMERCIAL

## 2024-08-23 ENCOUNTER — TELEPHONE (OUTPATIENT)
Dept: OBSTETRICS AND GYNECOLOGY | Facility: CLINIC | Age: 34
End: 2024-08-23

## 2024-08-23 ENCOUNTER — ANESTHESIA (OUTPATIENT)
Dept: OBSTETRICS AND GYNECOLOGY | Facility: HOSPITAL | Age: 34
End: 2024-08-23
Payer: COMMERCIAL

## 2024-08-23 ENCOUNTER — ANESTHESIA EVENT (OUTPATIENT)
Dept: OBSTETRICS AND GYNECOLOGY | Facility: HOSPITAL | Age: 34
End: 2024-08-23
Payer: COMMERCIAL

## 2024-08-23 ENCOUNTER — APPOINTMENT (OUTPATIENT)
Dept: PEDIATRIC CARDIOLOGY | Facility: HOSPITAL | Age: 34
End: 2024-08-23
Payer: COMMERCIAL

## 2024-08-23 DIAGNOSIS — O35.BXX0 ABNORMAL FETAL ECHOCARDIOGRAPHY AFFECTING ANTEPARTUM CARE OF MOTHER, SINGLE OR UNSPECIFIED FETUS (HHS-HCC): Primary | ICD-10-CM

## 2024-08-23 DIAGNOSIS — R52 POSTPARTUM PAIN (HHS-HCC): Primary | ICD-10-CM

## 2024-08-23 PROBLEM — Z37.9 NORMAL LABOR (HHS-HCC): Status: ACTIVE | Noted: 2024-08-23

## 2024-08-23 LAB
ABO GROUP (TYPE) IN BLOOD: NORMAL
ABO GROUP (TYPE) IN BLOOD: NORMAL
ANTIBODY SCREEN: NORMAL
BLOOD EXPIRATION DATE: NORMAL
DISPENSE STATUS: NORMAL
ERYTHROCYTE [DISTWIDTH] IN BLOOD BY AUTOMATED COUNT: 14 % (ref 11.5–14.5)
HCT VFR BLD AUTO: 36.7 % (ref 36–46)
HGB BLD-MCNC: 11.8 G/DL (ref 12–16)
HOLD SPECIMEN: NORMAL
MCH RBC QN AUTO: 28 PG (ref 26–34)
MCHC RBC AUTO-ENTMCNC: 32.2 G/DL (ref 32–36)
MCV RBC AUTO: 87 FL (ref 80–100)
NRBC BLD-RTO: 0 /100 WBCS (ref 0–0)
PLATELET # BLD AUTO: 182 X10*3/UL (ref 150–450)
PRODUCT BLOOD TYPE: 6200
PRODUCT CODE: NORMAL
RBC # BLD AUTO: 4.22 X10*6/UL (ref 4–5.2)
RH FACTOR (ANTIGEN D): NORMAL
RH FACTOR (ANTIGEN D): NORMAL
UNIT ABO: NORMAL
UNIT NUMBER: NORMAL
UNIT RH: NORMAL
UNIT VOLUME: 350
WBC # BLD AUTO: 11.8 X10*3/UL (ref 4.4–11.3)
XM INTEP: NORMAL

## 2024-08-23 PROCEDURE — 85027 COMPLETE CBC AUTOMATED: CPT | Performed by: OBSTETRICS & GYNECOLOGY

## 2024-08-23 PROCEDURE — 2500000004 HC RX 250 GENERAL PHARMACY W/ HCPCS (ALT 636 FOR OP/ED): Performed by: NURSE ANESTHETIST, CERTIFIED REGISTERED

## 2024-08-23 PROCEDURE — 10907ZC DRAINAGE OF AMNIOTIC FLUID, THERAPEUTIC FROM PRODUCTS OF CONCEPTION, VIA NATURAL OR ARTIFICIAL OPENING: ICD-10-PCS | Performed by: OBSTETRICS & GYNECOLOGY

## 2024-08-23 PROCEDURE — 1120000001 HC OB PRIVATE ROOM DAILY

## 2024-08-23 PROCEDURE — 2500000004 HC RX 250 GENERAL PHARMACY W/ HCPCS (ALT 636 FOR OP/ED): Performed by: OBSTETRICS & GYNECOLOGY

## 2024-08-23 PROCEDURE — 36415 COLL VENOUS BLD VENIPUNCTURE: CPT | Performed by: ADVANCED PRACTICE MIDWIFE

## 2024-08-23 PROCEDURE — 36415 COLL VENOUS BLD VENIPUNCTURE: CPT | Performed by: OBSTETRICS & GYNECOLOGY

## 2024-08-23 PROCEDURE — 51702 INSERT TEMP BLADDER CATH: CPT

## 2024-08-23 PROCEDURE — 86901 BLOOD TYPING SEROLOGIC RH(D): CPT | Performed by: OBSTETRICS & GYNECOLOGY

## 2024-08-23 PROCEDURE — 86920 COMPATIBILITY TEST SPIN: CPT

## 2024-08-23 PROCEDURE — 7210000002 HC LABOR PER HOUR

## 2024-08-23 PROCEDURE — 86780 TREPONEMA PALLIDUM: CPT | Mod: STJLAB | Performed by: OBSTETRICS & GYNECOLOGY

## 2024-08-23 PROCEDURE — 83036 HEMOGLOBIN GLYCOSYLATED A1C: CPT | Mod: STJLAB | Performed by: OBSTETRICS & GYNECOLOGY

## 2024-08-23 PROCEDURE — 3700000014 EPIDURAL BLOCK: Performed by: NURSE ANESTHETIST, CERTIFIED REGISTERED

## 2024-08-23 RX ORDER — LIDOCAINE HYDROCHLORIDE 10 MG/ML
30 INJECTION INFILTRATION; PERINEURAL ONCE AS NEEDED
Status: DISCONTINUED | OUTPATIENT
Start: 2024-08-23 | End: 2024-08-24

## 2024-08-23 RX ORDER — TRANEXAMIC ACID 100 MG/ML
1000 INJECTION, SOLUTION INTRAVENOUS ONCE AS NEEDED
Status: DISCONTINUED | OUTPATIENT
Start: 2024-08-23 | End: 2024-08-24

## 2024-08-23 RX ORDER — METOCLOPRAMIDE 10 MG/1
10 TABLET ORAL EVERY 6 HOURS PRN
Status: DISCONTINUED | OUTPATIENT
Start: 2024-08-23 | End: 2024-08-24

## 2024-08-23 RX ORDER — ONDANSETRON HYDROCHLORIDE 2 MG/ML
4 INJECTION, SOLUTION INTRAVENOUS EVERY 6 HOURS PRN
Status: DISCONTINUED | OUTPATIENT
Start: 2024-08-23 | End: 2024-08-24

## 2024-08-23 RX ORDER — HYDRALAZINE HYDROCHLORIDE 20 MG/ML
5 INJECTION INTRAMUSCULAR; INTRAVENOUS ONCE AS NEEDED
Status: DISCONTINUED | OUTPATIENT
Start: 2024-08-23 | End: 2024-08-24

## 2024-08-23 RX ORDER — METHYLERGONOVINE MALEATE 0.2 MG/ML
0.2 INJECTION INTRAVENOUS ONCE AS NEEDED
Status: DISCONTINUED | OUTPATIENT
Start: 2024-08-23 | End: 2024-08-24

## 2024-08-23 RX ORDER — TERBUTALINE SULFATE 1 MG/ML
0.25 INJECTION SUBCUTANEOUS ONCE AS NEEDED
Status: COMPLETED | OUTPATIENT
Start: 2024-08-23 | End: 2024-08-24

## 2024-08-23 RX ORDER — NIFEDIPINE 10 MG/1
10 CAPSULE ORAL ONCE AS NEEDED
Status: DISCONTINUED | OUTPATIENT
Start: 2024-08-23 | End: 2024-08-24

## 2024-08-23 RX ORDER — ONDANSETRON 4 MG/1
4 TABLET, FILM COATED ORAL EVERY 6 HOURS PRN
Status: DISCONTINUED | OUTPATIENT
Start: 2024-08-23 | End: 2024-08-24

## 2024-08-23 RX ORDER — OXYTOCIN/0.9 % SODIUM CHLORIDE 30/500 ML
60 PLASTIC BAG, INJECTION (ML) INTRAVENOUS ONCE AS NEEDED
Status: DISCONTINUED | OUTPATIENT
Start: 2024-08-23 | End: 2024-08-24

## 2024-08-23 RX ORDER — METOCLOPRAMIDE HYDROCHLORIDE 5 MG/ML
10 INJECTION INTRAMUSCULAR; INTRAVENOUS EVERY 6 HOURS PRN
Status: DISCONTINUED | OUTPATIENT
Start: 2024-08-23 | End: 2024-08-24

## 2024-08-23 RX ORDER — OXYTOCIN 10 [USP'U]/ML
10 INJECTION, SOLUTION INTRAMUSCULAR; INTRAVENOUS ONCE AS NEEDED
Status: DISCONTINUED | OUTPATIENT
Start: 2024-08-23 | End: 2024-08-24

## 2024-08-23 RX ORDER — CARBOPROST TROMETHAMINE 250 UG/ML
250 INJECTION, SOLUTION INTRAMUSCULAR ONCE AS NEEDED
Status: DISCONTINUED | OUTPATIENT
Start: 2024-08-23 | End: 2024-08-24

## 2024-08-23 RX ORDER — LOPERAMIDE HYDROCHLORIDE 2 MG/1
4 CAPSULE ORAL EVERY 2 HOUR PRN
Status: DISCONTINUED | OUTPATIENT
Start: 2024-08-23 | End: 2024-08-24

## 2024-08-23 RX ORDER — MISOPROSTOL 200 UG/1
800 TABLET ORAL ONCE AS NEEDED
Status: DISCONTINUED | OUTPATIENT
Start: 2024-08-23 | End: 2024-08-24

## 2024-08-23 RX ORDER — FENTANYL/ROPIVACAINE/NS/PF 2MCG/ML-.2
0-25 PLASTIC BAG, INJECTION (ML) INJECTION CONTINUOUS
Status: DISCONTINUED | OUTPATIENT
Start: 2024-08-23 | End: 2024-08-24

## 2024-08-23 RX ORDER — LABETALOL HYDROCHLORIDE 5 MG/ML
20 INJECTION, SOLUTION INTRAVENOUS ONCE AS NEEDED
Status: DISCONTINUED | OUTPATIENT
Start: 2024-08-23 | End: 2024-08-24

## 2024-08-23 RX ORDER — SODIUM CHLORIDE, SODIUM LACTATE, POTASSIUM CHLORIDE, CALCIUM CHLORIDE 600; 310; 30; 20 MG/100ML; MG/100ML; MG/100ML; MG/100ML
125 INJECTION, SOLUTION INTRAVENOUS CONTINUOUS
Status: DISCONTINUED | OUTPATIENT
Start: 2024-08-23 | End: 2024-08-24

## 2024-08-23 SDOH — SOCIAL STABILITY: SOCIAL INSECURITY: ARE YOU OR HAVE YOU BEEN THREATENED OR ABUSED PHYSICALLY, EMOTIONALLY, OR SEXUALLY BY ANYONE?: NO

## 2024-08-23 SDOH — SOCIAL STABILITY: SOCIAL INSECURITY: HAVE YOU HAD THOUGHTS OF HARMING ANYONE ELSE?: NO

## 2024-08-23 SDOH — SOCIAL STABILITY: SOCIAL INSECURITY: DOES ANYONE TRY TO KEEP YOU FROM HAVING/CONTACTING OTHER FRIENDS OR DOING THINGS OUTSIDE YOUR HOME?: NO

## 2024-08-23 SDOH — SOCIAL STABILITY: SOCIAL INSECURITY: ARE THERE ANY APPARENT SIGNS OF INJURIES/BEHAVIORS THAT COULD BE RELATED TO ABUSE/NEGLECT?: NO

## 2024-08-23 SDOH — HEALTH STABILITY: MENTAL HEALTH: CURRENT SMOKER: 0

## 2024-08-23 SDOH — HEALTH STABILITY: MENTAL HEALTH: HAVE YOU USED ANY PRESCRIPTION DRUGS OTHER THAN PRESCRIBED IN THE PAST 12 MONTHS?: NO

## 2024-08-23 SDOH — HEALTH STABILITY: MENTAL HEALTH: SUICIDAL BEHAVIOR (LIFETIME): NO

## 2024-08-23 SDOH — HEALTH STABILITY: MENTAL HEALTH: HAVE YOU USED ANY SUBSTANCES (CANABIS, COCAINE, HEROIN, HALLUCINOGENS, INHALANTS, ETC.) IN THE PAST 12 MONTHS?: NO

## 2024-08-23 SDOH — SOCIAL STABILITY: SOCIAL INSECURITY: VERBAL ABUSE: DENIES

## 2024-08-23 SDOH — SOCIAL STABILITY: SOCIAL INSECURITY: HAVE YOU HAD ANY THOUGHTS OF HARMING ANYONE ELSE?: NO

## 2024-08-23 SDOH — SOCIAL STABILITY: SOCIAL INSECURITY: DO YOU FEEL ANYONE HAS EXPLOITED OR TAKEN ADVANTAGE OF YOU FINANCIALLY OR OF YOUR PERSONAL PROPERTY?: NO

## 2024-08-23 SDOH — SOCIAL STABILITY: SOCIAL INSECURITY: PHYSICAL ABUSE: DENIES

## 2024-08-23 SDOH — HEALTH STABILITY: MENTAL HEALTH: WISH TO BE DEAD (PAST 1 MONTH): NO

## 2024-08-23 SDOH — SOCIAL STABILITY: SOCIAL INSECURITY: HAS ANYONE EVER THREATENED TO HURT YOUR FAMILY OR YOUR PETS?: NO

## 2024-08-23 SDOH — HEALTH STABILITY: MENTAL HEALTH: WERE YOU ABLE TO COMPLETE ALL THE BEHAVIORAL HEALTH SCREENINGS?: YES

## 2024-08-23 SDOH — SOCIAL STABILITY: SOCIAL INSECURITY: ABUSE SCREEN: ADULT

## 2024-08-23 SDOH — ECONOMIC STABILITY: HOUSING INSECURITY: DO YOU FEEL UNSAFE GOING BACK TO THE PLACE WHERE YOU ARE LIVING?: NO

## 2024-08-23 SDOH — HEALTH STABILITY: MENTAL HEALTH: NON-SPECIFIC ACTIVE SUICIDAL THOUGHTS (PAST 1 MONTH): NO

## 2024-08-23 ASSESSMENT — LIFESTYLE VARIABLES
HOW OFTEN DO YOU HAVE A DRINK CONTAINING ALCOHOL: NEVER
HOW MANY STANDARD DRINKS CONTAINING ALCOHOL DO YOU HAVE ON A TYPICAL DAY: PATIENT DOES NOT DRINK
SKIP TO QUESTIONS 9-10: 1
HOW OFTEN DO YOU HAVE 6 OR MORE DRINKS ON ONE OCCASION: NEVER
AUDIT-C TOTAL SCORE: 0
AUDIT-C TOTAL SCORE: 0

## 2024-08-23 ASSESSMENT — PAIN SCALES - GENERAL
PAINLEVEL_OUTOF10: 6
PAINLEVEL_OUTOF10: 9
PAINLEVEL_OUTOF10: 2
PAINLEVEL_OUTOF10: 2
PAINLEVEL_OUTOF10: 4

## 2024-08-23 ASSESSMENT — PATIENT HEALTH QUESTIONNAIRE - PHQ9
SUM OF ALL RESPONSES TO PHQ9 QUESTIONS 1 & 2: 0
2. FEELING DOWN, DEPRESSED OR HOPELESS: NOT AT ALL
1. LITTLE INTEREST OR PLEASURE IN DOING THINGS: NOT AT ALL

## 2024-08-23 NOTE — TELEPHONE ENCOUNTER
Patient called just now at 38-2 with contractions q10-15 for the last 2 hours. No LOF, bleeding and baby is moving. States she wants to labor at home for as long as possible. advised to call answering service with additional questions past 430, or to head in with closer contractions 5-6 apart or if ROM. Dr Gerard notified and agreed to plan. Patient also vocalized understanding. No further questions.

## 2024-08-24 LAB
EST. AVERAGE GLUCOSE BLD GHB EST-MCNC: 108 MG/DL
HBA1C MFR BLD: 5.4 %
TREPONEMA PALLIDUM IGG+IGM AB [PRESENCE] IN SERUM OR PLASMA BY IMMUNOASSAY: NONREACTIVE

## 2024-08-24 PROCEDURE — 2500000004 HC RX 250 GENERAL PHARMACY W/ HCPCS (ALT 636 FOR OP/ED): Performed by: NURSE ANESTHETIST, CERTIFIED REGISTERED

## 2024-08-24 PROCEDURE — 2500000001 HC RX 250 WO HCPCS SELF ADMINISTERED DRUGS (ALT 637 FOR MEDICARE OP): Performed by: NURSE ANESTHETIST, CERTIFIED REGISTERED

## 2024-08-24 PROCEDURE — 59514 CESAREAN DELIVERY ONLY: CPT | Performed by: OBSTETRICS & GYNECOLOGY

## 2024-08-24 PROCEDURE — 2500000004 HC RX 250 GENERAL PHARMACY W/ HCPCS (ALT 636 FOR OP/ED): Mod: JZ | Performed by: OBSTETRICS & GYNECOLOGY

## 2024-08-24 PROCEDURE — 51701 INSERT BLADDER CATHETER: CPT

## 2024-08-24 PROCEDURE — 59050 FETAL MONITOR W/REPORT: CPT

## 2024-08-24 PROCEDURE — 2720000007 HC OR 272 NO HCPCS: Performed by: OBSTETRICS & GYNECOLOGY

## 2024-08-24 PROCEDURE — 7210000002 HC LABOR PER HOUR

## 2024-08-24 PROCEDURE — 7100000016 HC LABOR RECOVERY PER HOUR: Performed by: OBSTETRICS & GYNECOLOGY

## 2024-08-24 PROCEDURE — 3700000014 HC AN EPIDURAL BLOCK CHARGE: Performed by: OBSTETRICS & GYNECOLOGY

## 2024-08-24 PROCEDURE — 1220000001 HC OB SEMI-PRIVATE ROOM DAILY

## 2024-08-24 PROCEDURE — 2500000004 HC RX 250 GENERAL PHARMACY W/ HCPCS (ALT 636 FOR OP/ED): Performed by: OBSTETRICS & GYNECOLOGY

## 2024-08-24 PROCEDURE — 2500000005 HC RX 250 GENERAL PHARMACY W/O HCPCS: Performed by: NURSE ANESTHETIST, CERTIFIED REGISTERED

## 2024-08-24 RX ORDER — NIFEDIPINE 10 MG/1
10 CAPSULE ORAL ONCE AS NEEDED
Status: DISCONTINUED | OUTPATIENT
Start: 2024-08-24 | End: 2024-08-25 | Stop reason: HOSPADM

## 2024-08-24 RX ORDER — ONDANSETRON HYDROCHLORIDE 2 MG/ML
4 INJECTION, SOLUTION INTRAVENOUS EVERY 6 HOURS PRN
Status: DISCONTINUED | OUTPATIENT
Start: 2024-08-24 | End: 2024-08-25 | Stop reason: HOSPADM

## 2024-08-24 RX ORDER — TRANEXAMIC ACID 100 MG/ML
1000 INJECTION, SOLUTION INTRAVENOUS ONCE AS NEEDED
Status: DISCONTINUED | OUTPATIENT
Start: 2024-08-24 | End: 2024-08-25 | Stop reason: HOSPADM

## 2024-08-24 RX ORDER — KETOROLAC TROMETHAMINE 30 MG/ML
30 INJECTION, SOLUTION INTRAMUSCULAR; INTRAVENOUS EVERY 6 HOURS
Status: COMPLETED | OUTPATIENT
Start: 2024-08-24 | End: 2024-08-24

## 2024-08-24 RX ORDER — POLYETHYLENE GLYCOL 3350 17 G/17G
17 POWDER, FOR SOLUTION ORAL 2 TIMES DAILY PRN
Status: DISCONTINUED | OUTPATIENT
Start: 2024-08-24 | End: 2024-08-25 | Stop reason: HOSPADM

## 2024-08-24 RX ORDER — SIMETHICONE 80 MG
80 TABLET,CHEWABLE ORAL 4 TIMES DAILY PRN
Status: DISCONTINUED | OUTPATIENT
Start: 2024-08-24 | End: 2024-08-25 | Stop reason: HOSPADM

## 2024-08-24 RX ORDER — METHYLERGONOVINE MALEATE 0.2 MG/ML
0.2 INJECTION INTRAVENOUS ONCE AS NEEDED
Status: DISCONTINUED | OUTPATIENT
Start: 2024-08-24 | End: 2024-08-25 | Stop reason: HOSPADM

## 2024-08-24 RX ORDER — LIDOCAINE 560 MG/1
1 PATCH PERCUTANEOUS; TOPICAL; TRANSDERMAL
Status: DISCONTINUED | OUTPATIENT
Start: 2024-08-24 | End: 2024-08-25 | Stop reason: HOSPADM

## 2024-08-24 RX ORDER — LIDOCAINE HYDROCHLORIDE 20 MG/ML
INJECTION, SOLUTION EPIDURAL; INFILTRATION; INTRACAUDAL; PERINEURAL AS NEEDED
Status: DISCONTINUED | OUTPATIENT
Start: 2024-08-24 | End: 2024-08-24

## 2024-08-24 RX ORDER — DIPHENHYDRAMINE HYDROCHLORIDE 50 MG/ML
25 INJECTION INTRAMUSCULAR; INTRAVENOUS EVERY 4 HOURS PRN
Status: DISCONTINUED | OUTPATIENT
Start: 2024-08-24 | End: 2024-08-25 | Stop reason: HOSPADM

## 2024-08-24 RX ORDER — OXYTOCIN/0.9 % SODIUM CHLORIDE 30/500 ML
PLASTIC BAG, INJECTION (ML) INTRAVENOUS CONTINUOUS PRN
Status: DISCONTINUED | OUTPATIENT
Start: 2024-08-24 | End: 2024-08-24

## 2024-08-24 RX ORDER — ONDANSETRON 4 MG/1
4 TABLET, FILM COATED ORAL EVERY 6 HOURS PRN
Status: DISCONTINUED | OUTPATIENT
Start: 2024-08-24 | End: 2024-08-25 | Stop reason: HOSPADM

## 2024-08-24 RX ORDER — KETOROLAC TROMETHAMINE 30 MG/ML
INJECTION, SOLUTION INTRAMUSCULAR; INTRAVENOUS AS NEEDED
Status: DISCONTINUED | OUTPATIENT
Start: 2024-08-24 | End: 2024-08-24

## 2024-08-24 RX ORDER — BISACODYL 10 MG/1
10 SUPPOSITORY RECTAL DAILY PRN
Status: DISCONTINUED | OUTPATIENT
Start: 2024-08-24 | End: 2024-08-25 | Stop reason: HOSPADM

## 2024-08-24 RX ORDER — PROPOFOL 10 MG/ML
INJECTION, EMULSION INTRAVENOUS AS NEEDED
Status: DISCONTINUED | OUTPATIENT
Start: 2024-08-24 | End: 2024-08-24

## 2024-08-24 RX ORDER — DEXMEDETOMIDINE HYDROCHLORIDE 100 UG/ML
INJECTION, SOLUTION INTRAVENOUS AS NEEDED
Status: DISCONTINUED | OUTPATIENT
Start: 2024-08-24 | End: 2024-08-24

## 2024-08-24 RX ORDER — ACETAMINOPHEN 325 MG/1
975 TABLET ORAL EVERY 6 HOURS
Status: DISCONTINUED | OUTPATIENT
Start: 2024-08-24 | End: 2024-08-25 | Stop reason: HOSPADM

## 2024-08-24 RX ORDER — IBUPROFEN 600 MG/1
600 TABLET ORAL EVERY 6 HOURS
Status: DISCONTINUED | OUTPATIENT
Start: 2024-08-25 | End: 2024-08-25 | Stop reason: HOSPADM

## 2024-08-24 RX ORDER — CARBOPROST TROMETHAMINE 250 UG/ML
250 INJECTION, SOLUTION INTRAMUSCULAR ONCE AS NEEDED
Status: DISCONTINUED | OUTPATIENT
Start: 2024-08-24 | End: 2024-08-25 | Stop reason: HOSPADM

## 2024-08-24 RX ORDER — FENTANYL CITRATE 50 UG/ML
INJECTION, SOLUTION INTRAMUSCULAR; INTRAVENOUS AS NEEDED
Status: DISCONTINUED | OUTPATIENT
Start: 2024-08-24 | End: 2024-08-24

## 2024-08-24 RX ORDER — CEFAZOLIN SODIUM 2 G/100ML
2 INJECTION, SOLUTION INTRAVENOUS ONCE
Status: COMPLETED | OUTPATIENT
Start: 2024-08-24 | End: 2024-08-24

## 2024-08-24 RX ORDER — LOPERAMIDE HYDROCHLORIDE 2 MG/1
4 CAPSULE ORAL EVERY 2 HOUR PRN
Status: DISCONTINUED | OUTPATIENT
Start: 2024-08-24 | End: 2024-08-25 | Stop reason: HOSPADM

## 2024-08-24 RX ORDER — OXYTOCIN 10 [USP'U]/ML
10 INJECTION, SOLUTION INTRAMUSCULAR; INTRAVENOUS ONCE AS NEEDED
Status: DISCONTINUED | OUTPATIENT
Start: 2024-08-24 | End: 2024-08-25 | Stop reason: HOSPADM

## 2024-08-24 RX ORDER — OXYCODONE HYDROCHLORIDE 10 MG/1
10 TABLET ORAL EVERY 4 HOURS PRN
Status: DISCONTINUED | OUTPATIENT
Start: 2024-08-25 | End: 2024-08-25 | Stop reason: HOSPADM

## 2024-08-24 RX ORDER — ENOXAPARIN SODIUM 100 MG/ML
40 INJECTION SUBCUTANEOUS EVERY 24 HOURS
Status: DISCONTINUED | OUTPATIENT
Start: 2024-08-24 | End: 2024-08-25 | Stop reason: HOSPADM

## 2024-08-24 RX ORDER — LABETALOL HYDROCHLORIDE 5 MG/ML
20 INJECTION, SOLUTION INTRAVENOUS ONCE AS NEEDED
Status: DISCONTINUED | OUTPATIENT
Start: 2024-08-24 | End: 2024-08-25 | Stop reason: HOSPADM

## 2024-08-24 RX ORDER — ADHESIVE BANDAGE
10 BANDAGE TOPICAL
Status: DISCONTINUED | OUTPATIENT
Start: 2024-08-24 | End: 2024-08-25 | Stop reason: HOSPADM

## 2024-08-24 RX ORDER — NALOXONE HYDROCHLORIDE 0.4 MG/ML
0.1 INJECTION, SOLUTION INTRAMUSCULAR; INTRAVENOUS; SUBCUTANEOUS EVERY 5 MIN PRN
Status: DISCONTINUED | OUTPATIENT
Start: 2024-08-24 | End: 2024-08-25 | Stop reason: HOSPADM

## 2024-08-24 RX ORDER — ACETAMINOPHEN 120 MG/1
SUPPOSITORY RECTAL AS NEEDED
Status: DISCONTINUED | OUTPATIENT
Start: 2024-08-24 | End: 2024-08-24

## 2024-08-24 RX ORDER — OXYCODONE HYDROCHLORIDE 5 MG/1
5 TABLET ORAL EVERY 4 HOURS PRN
Status: DISCONTINUED | OUTPATIENT
Start: 2024-08-25 | End: 2024-08-25 | Stop reason: HOSPADM

## 2024-08-24 RX ORDER — MISOPROSTOL 200 UG/1
800 TABLET ORAL ONCE AS NEEDED
Status: DISCONTINUED | OUTPATIENT
Start: 2024-08-24 | End: 2024-08-25 | Stop reason: HOSPADM

## 2024-08-24 RX ORDER — OXYTOCIN/0.9 % SODIUM CHLORIDE 30/500 ML
60 PLASTIC BAG, INJECTION (ML) INTRAVENOUS ONCE AS NEEDED
Status: DISCONTINUED | OUTPATIENT
Start: 2024-08-24 | End: 2024-08-25 | Stop reason: HOSPADM

## 2024-08-24 RX ORDER — DIPHENHYDRAMINE HCL 25 MG
25 TABLET ORAL EVERY 4 HOURS PRN
Status: DISCONTINUED | OUTPATIENT
Start: 2024-08-24 | End: 2024-08-25 | Stop reason: HOSPADM

## 2024-08-24 RX ORDER — HYDROMORPHONE HYDROCHLORIDE 1 MG/ML
0.2 INJECTION, SOLUTION INTRAMUSCULAR; INTRAVENOUS; SUBCUTANEOUS EVERY 5 MIN PRN
Status: DISCONTINUED | OUTPATIENT
Start: 2024-08-24 | End: 2024-08-25 | Stop reason: HOSPADM

## 2024-08-24 RX ORDER — MORPHINE SULFATE 1 MG/ML
INJECTION, SOLUTION EPIDURAL; INTRATHECAL; INTRAVENOUS AS NEEDED
Status: DISCONTINUED | OUTPATIENT
Start: 2024-08-24 | End: 2024-08-24

## 2024-08-24 RX ORDER — HYDRALAZINE HYDROCHLORIDE 20 MG/ML
5 INJECTION INTRAMUSCULAR; INTRAVENOUS ONCE AS NEEDED
Status: DISCONTINUED | OUTPATIENT
Start: 2024-08-24 | End: 2024-08-25 | Stop reason: HOSPADM

## 2024-08-24 ASSESSMENT — PAIN SCALES - GENERAL
PAINLEVEL_OUTOF10: 0 - NO PAIN
PAINLEVEL_OUTOF10: 5 - MODERATE PAIN
PAINLEVEL_OUTOF10: 10 - WORST POSSIBLE PAIN
PAINLEVEL_OUTOF10: 2
PAINLEVEL_OUTOF10: 0 - NO PAIN
PAIN_LEVEL: 3
PAINLEVEL_OUTOF10: 10 - WORST POSSIBLE PAIN
PAINLEVEL_OUTOF10: 10 - WORST POSSIBLE PAIN
PAINLEVEL_OUTOF10: 0 - NO PAIN
PAINLEVEL_OUTOF10: 0 - NO PAIN
PAINLEVEL_OUTOF10: 9
PAINLEVEL_OUTOF10: 2
PAINLEVEL_OUTOF10: 3
PAINLEVEL_OUTOF10: 10 - WORST POSSIBLE PAIN
PAINLEVEL_OUTOF10: 10 - WORST POSSIBLE PAIN
PAINLEVEL_OUTOF10: 4

## 2024-08-24 ASSESSMENT — ACTIVITIES OF DAILY LIVING (ADL): LACK_OF_TRANSPORTATION: NO

## 2024-08-24 ASSESSMENT — PAIN - FUNCTIONAL ASSESSMENT
PAIN_FUNCTIONAL_ASSESSMENT: 0-10
PAIN_FUNCTIONAL_ASSESSMENT: 0-10

## 2024-08-24 ASSESSMENT — PAIN DESCRIPTION - DESCRIPTORS: DESCRIPTORS: ACHING

## 2024-08-24 NOTE — CARE PLAN
The patient's goals for the shift include Have a baby    The clinical goals for the shift include Reassuring FHR    Over the shift, the patient did make progress toward the following goals.

## 2024-08-24 NOTE — CARE PLAN
The patient's goals for the shift include get hicks catheter out and breast feed well    The clinical goals for the shift include attempt to ambulate    Over the shift, the patient did not make progress toward the following goals. Barriers to progression include uncomfortable from major abdominal surgery. Recommendations to address these barriers include medicate patient routinely.

## 2024-08-24 NOTE — ANESTHESIA POSTPROCEDURE EVALUATION
Patient: Talia Talavera    Procedure Summary       Date: 24 Room / Location: [facility]    Anesthesia Start:  Anesthesia Stop: 24    Procedure: OBGYN Delivery  Section Diagnosis: (Arrest of Descent)    Surgeons: Tabitha Pardo MD Responsible Provider: CHANNING Cardozo    Anesthesia Type: epidural ASA Status: 2            Anesthesia Type: epidural    Vitals Value Taken Time   /55 24 0352   Temp 36.5 24 0359   Pulse 111 24   Resp 16 24   SpO2 100 % 24       Anesthesia Post Evaluation    Patient location during evaluation: bedside  Patient participation: complete - patient participated  Level of consciousness: awake and alert  Pain score: 3  Pain management: adequate  Multimodal analgesia pain management approach  Airway patency: patent  Two or more strategies used to mitigate risk of obstructive sleep apnea  Cardiovascular status: acceptable  Respiratory status: acceptable  Hydration status: acceptable  Postoperative Nausea and Vomiting: none        No notable events documented.

## 2024-08-24 NOTE — PROGRESS NOTES
Late entry :   Pt was 10 cm / having strong urge to push. Pt given bedside encouragement and coaching to push effectively.    She would have anxiety and stop pushing several times.   Epidural was dosed and supplemental pain meds given by CRNA several times over 50 minutes . Pt unable to push effectively with moderate anxiety, lack of focus and difficulty using abdominal and pelvic muscles to push.  Pt agreed to csection for delivery.  VE 10/100/ +1     FHR Cat 1 , Ctxs q 3min . NOT on pitocin.   Because of her demeanor , and continued complaint of severe  lower pelvic pain, I suspected possible uterine scar dehiscence. Urgent csection called and pt agreed.    Tabitha Pardo MD

## 2024-08-24 NOTE — NURSING NOTE
0120: RN called bedside due to strong urge to push from patient. RN attempting to push with patient. Patient not wanting to get into pushing position or push with contractions.     0130: MD Pardo bedside to assist with pushing. Much coaching efforts attempted to assist patient with pushing.     0150: Patient wanting to attempt to push in hands in knees with more epidural anesthesia. Patient not wanting to push with contractions. Patient is requesting time to make decision regarding c section    0205: RN bedside attempting to trace FHR.     0210: MD Pardo and RN bedside to talk with patient regarding plan of care.     0214: Terbutaline given to patient and decision for c section made due to suspicion for uterine rupture per MD Pardo.     0225: To OR

## 2024-08-24 NOTE — H&P
Obstetrical Admission History and Physical     Talia Talavera is a 34 y.o.  at 38w2d. BELA: 2024, by Last Menstrual Period. Estimated fetal weight: 7.5 lb . She has had prenatal care with [facility]  .    Chief Complaint: Contractions, Leakage/Loss of Fluid, and Vaginal Bleeding    Started ctxs irreg timing, now q 5-10 min. Had a small amt vag bleeding.No gush of fluid.  Now breathing hard w painful ctxs.   Wants TOLAC, extensively counseled w unsuccessful pushing x 4hrs of her first baby 7lb 10 oz   This baby EFW same size.  MFMU score 54 % Pt declines repeat csection         Assessment/Plan    TOLAC   Admit in labor with cervix 3-4 cm. Then asked for epidural.   After epidural Vag exam showed bleeding w 5 cm size blood clot in vagina.  FHR Cat 1 . Blood was wiped away fr vaginal mucosa and AROM done. Clear fluid seen. Suggests blood was from cervix dilation.  After AROM , cervix = 7cm/90/0       Assessment & Plan  Normal labor ([facility])        Pregnancy Problems (from 24 to present)    TOLAC / Previous LTCS 2 yrs ago for cat 2 FHR and 4 hrs pushing    Antepartum FHR arrythmia - resolved see Peds Card notes . PACs seen on ECHO   Delivery per OB at patient's preferred hospital. Standard  care per  team. Cardiology evaluation prior to discharge if born at [facility] or as an outpatient within 1-2 weeks if born at an outside facility.      Problem Noted Resolved    Normal pregnancy in multigravida in first trimester ([facility]) 2024 by BEAN Pandey No    Priority:  Medium      Overview Addendum 8/15/2024 11:02 AM by Caitlyn Carbajal MD       *Transferred to physician care at 32 weeks for lack of GDM screening*  *PNLs completed 24 at [facility] - scanned into Media tab*  -Declines flu and COVID vaccination  -Declines tdap  -LACK OF DIABETES screening -fasting glucose elevated but were not truly fasting otherwise values in range,   continued to decline  growth scans    HBA1C drawn on admission              Options for delivery have been discussed with the patient and she elects a vaginal delivery.  Labor has been discussed in detail. The risks, benefits, complications, alternatives, expected outcomes, potential problems during recuperation and recovery, and the risks of not performing the procedure were discussed with the patient. The patient stated understanding that the risks of delivery include, but are not limited to: death; reaction to medications; injury to bowel, bladder, ureters, uterus, cervix, vagina, and other pelvic and abdominal structures, infection; blood loss and possible need for transfusion; and potential need for surgery, including hysterectomy. The risks of injury to the infant during delivery were also discussed. All questions were answered. There was concurrence with the planned procedure, and the patient wanted to proceed.    Admit to inpatient status. I anticipate that this patient will require a stay exceeding at least 2 midnights for delivery and postpartum care.  Active management of labor.  Management of pregnancy complications, as indicated.    Subjective   Talia is here complaining of q5 min contractions. Good fetal movement. Denies leaking of fluid.  , some vag bloody mucus            Obstetrical History   OB History    Para Term  AB Living   2 1 1 0 0 1   SAB IAB Ectopic Multiple Live Births   0 0 0 0 1      # Outcome Date GA Lbr Warner/2nd Weight Sex Type Anes PTL Lv   2 Current            1 Term 22 39w0d  3.234 kg F CS-LTranv Spinal N CLARENCE       Past Medical History  Past Medical History:   Diagnosis Date    History of group B Streptococcus (GBS) infection     Hx of abnormal cervical Pap smear     Hx of anesthetic allergy     During     Hx of  section         Past Surgical History   Past Surgical History:   Procedure Laterality Date     SECTION, LOW TRANSVERSE         Social  History  Social History     Tobacco Use    Smoking status: Never    Smokeless tobacco: Never   Substance Use Topics    Alcohol use: Not Currently     Substance and Sexual Activity   Drug Use Never       Allergies  Patient has no known allergies.     Medications  Medications Prior to Admission   Medication Sig Dispense Refill Last Dose    alcohol swabs pads, medicated Use 1, up to 5 times a day 200 each 0     cholecalciferol, vitamin D3, (DIALYVITE VITAMIN D ORAL) Take by mouth.       FreeStyle lancets 28 gauge Use as instructed 28 each 0     isopropyl alcohoL 70 % towelette Test as directed 30 each 0     prenatal vit no.124/iron/folic (PRENATAL VITAMIN ORAL) Take by mouth once every day.          Objective    Last Vitals  Temp Pulse Resp BP MAP O2 Sat     82   121/71 90 97 %     Physical Examination  GENERAL: Examination reveals a well developed, well nourished, gravid female in no acute distress. She is alert and cooperative.  ABDOMEN: soft, gravid, nontender, nondistended, no abnormal masses, no epigastric pain  FHR is  , with Accelerations, and a Category I tracing.    The fetus is in a vertex presentation, determined by ultrasound  CERVIX:  7/90/0  ; MEMBRANES are AROM- clear fluid   Ctxs q 3 min       Lab Review  Labs in chart were reviewed.

## 2024-08-24 NOTE — PROGRESS NOTES
Spiritual Care Visit    Clinical Encounter Type  Visited With: Patient and family together  Routine Visit: Introduction          Purpose of visit was to congratulate mom and baby.  Dad, Grandma, and Aunt were present.  Patient stated she is non-[employer]avian so I have updated her record.  Patient was happy to receive the baby bible as a keepsake.

## 2024-08-24 NOTE — CARE PLAN
The patient's goals for the shift include Have a baby    The clinical goals for the shift include Reassuring FHR      Problem: Postpartum  Goal: Experiences normal postpartum course  Outcome: Progressing  Flowsheets (Taken 2024)  Experiences normal postpartum course:   Assess uterine involution   Med administration/monitoring of effect   Monitor maternal vital signs  Goal: Incisions, wounds, or drain sites healing without S/S of infection  Outcome: Progressing  Flowsheets (Taken 2024)  Incisions, wounds, or drain sites healing without sign and symptoms of infection: ADMISSION and DAILY: Assess and document risk factors for pressure ulcer development     Problem:  Recovery Care  Goal: Verbalizes understanding of post-op instructions  Outcome: Progressing  Flowsheets (Taken 2024)  Patient verbalizes understanding of post-op teaching: Provide post-op teaching  Goal: Manages discomfort  Outcome: Progressing  Flowsheets (Taken 2024)  Manages discomfort: Assess and monitor for signs and symptoms of discomfort     Problem: Vaginal Birth or  Section  Goal: Fetal and maternal status remain reassuring during the birth process  Outcome: Met  Flowsheets (Taken 2024)  Fetal and maternal status remain reassuring during the birth process:   Monitor uterine activity   Monitor fetal heart rate   Monitor labor progression (Vaginal delivery)   Monitor vital signs   Med administration/monitoring of effect  Goal: Minimal s/sx of HDP and BP<160/110  Outcome: Met  Flowsheets (Taken 2024)  Minimal s/sx of HDP and BP <160/110:   Med administration/monitoring of effect   Monitor QBL and vital signs       Problem: Vaginal Birth or  Section  Goal: Prevention of malpresentation/labor dystocia through delivery  Outcome: Not Progressing  Flowsheets (Taken 2024)  Prevention of malpresentation/labor dystocia through delivery: Positioning, turning, and/or  use of birthing ball assistance  Goal: Demonstrates labor coping techniques through delivery  Outcome: Not Progressing  Flowsheets (Taken 8/24/2024 0577)  Demonstrates labor coping techniques through delivery:   Positioning, turning, and/or use of birthing ball assistance   Breathing/relaxation assistance

## 2024-08-24 NOTE — ANESTHESIA PREPROCEDURE EVALUATION
Patient: Talia Talavera    Evaluation Method: In-person visit    Procedure Information    Date: 24  Procedure: Labor Analgesia         Relevant Problems   GYN   (+) Normal pregnancy in multigravida in first trimester ([facility])       Clinical information reviewed:    Allergies  Meds               NPO Detail:  No data recorded     OB/Gyn Evaluation    Present Pregnancy    Patient is pregnant now.  (+) , previous  section   Obstetric History                Physical Exam    Airway  Mallampati: II  TM distance: >3 FB  Neck ROM: full     Cardiovascular - normal exam     Dental    Pulmonary - normal exam     Abdominal            Anesthesia Plan    History of general anesthesia?: no  History of complications of general anesthesia?: unknown/emergency    ASA 2     epidural   (I informed and discussed the risks and benefits of general, spinal and epidural anesthesia with the patient.  The patient expressed her understanding and her questions were answered.  A verbal consent was given by the patient.  )  The patient is not a current smoker.    Anesthetic plan and risks discussed with patient.  Use of blood products discussed with patient who consented to blood products.

## 2024-08-24 NOTE — NURSING NOTE
1340  This RN called to bedside assessing patient. Patient having increased bleeding. Patient feels fine. Fundus higher then this morning assessment. Bladder palpable. Pt encouraged to void. Silvestre BELTRAN notified.    Unsuccessful void. . Mane was removed at 1100.   Vitals WNL.    1400  Pt fundus @u and to pt's right. Bleeding scant.  Silvestre BELTRAN notified.    2:16 PM  Pt attempting void. Blowing bubbles. Hand in warm water. Running water. Using essential oils.    1515  Silvestre BELTRAN updated on POC. Patient requests to avoid straight cath. This RN educates patient needs straight cath if bleeding increases or patient feels off. Bleeding scant at this time. Refer to flowsheets.    Pt attempting to void    1700   Straight cath performed. MD Rivers notified. Bladder scanner used post void as pt states she still feels sensation and need to void. Scanner shows empty bladder.

## 2024-08-24 NOTE — L&D DELIVERY NOTE
OB Delivery Note  2024  Talia Talavera  34 y.o.   , Low Transverse       Gestational Age: 38w3d  /Para:   Quantitative Blood Loss: Admission to Discharge: 1000 mL (2024  7:26 PM - 2024  4:19 AM)    Francisco Talavera [[id number]]      Labor Events    Rupture date/time: 2024 2300  Rupture type: Artificial  Fluid color: Clear  Fluid odor: None  Labor type: Spontaneous Onset of Labor  Labor allowed to proceed with plans for an attempted vaginal birth?: Yes  Augmentation: None  Complications: None  Additional complications: Anxiety disorder in mother affecting childbirth ([facility])       Labor Event Times    Dilation complete date/time: 2024 0100  Start pushing date/time: 2024 0120  Decision date/time (emergent ): 2024 0214       Labor Length    2nd stage: 1h 51m  3rd stage: 0h 01m       Placenta    Placenta delivery date/time: 2024 0252  Placenta removal: Manual removal  Placenta appearance: Intact  Placenta disposition: discarded       Cord    Vessels: 3 vessels  Complications: None  Delayed cord clamping?: Yes  Cord clamped date/time: 2024 02:51:00  Cord blood disposition: Discarded  Gases sent?: Yes       Lacerations    Other lacerations?: No       Anesthesia    Method: Epidural       Operative Delivery    Forceps attempted?: No  Vacuum extractor attempted?: No       Shoulder Dystocia    Shoulder dystocia present?: No       [place] Delivery    Birth date/time: 2024 02:51:00  Delivery type: , Low Transverse   categorization: repeat   priority: urgent  Indications for : Failure to Progress, Prior Uterine Surgery, Repeat , Other (Add Comments)  Incision type: low transverse  Complications: None       Resuscitation    Method: Tactile stimulation       Apgars    Living status: Living  Apgar Component Scores:  1 min.:  5 min.:  10 min.:  15 min.:  20 min.:    Skin color:  0  1       Heart rate:   2  2       Reflex irritability:  2  2       Muscle tone:  2  2       Respiratory effort:  2  2       Total:  8  9       Apgars assigned by: JODY MOFFETT RN       Delivery Providers    Delivering clinician: Tabitha Pardo MD   Provider Role    Naya Monson, RN Delivery Nurse    Amy Moffett RN Nursery Nurse     Resident               CSECTION OP NOTE   Surgeons   Tabitha Pardo MD - Primary    Resident/Fellow/Other Assistant:  Desmond RIDER     Pre-operative diagnosis:    Term preg 38w2d . Failed TOLAC,  Severe Maternal anxiety  and inability to push effectively   REPEAT CSECTION   Postop Diagnosis : occiput posterior position     Indications for procedure :   Pt was 10 cm / having strong urge to push. Pt given bedside encouragement and coaching to push effectively.  She would have anxiety and stop pushing several times.   Epidural was dosed and supplemental pain meds given by CRNA several times over 50 minutes . Pt unable to push effectively with severe anxiety, lack of focus and difficulty using abdominal and pelvic muscles to push.  Pt agreed to csection for delivery.  VE 10/100/ +1     FHR Cat 1 , Ctxs q 3min . NOT on pitocin.   Because of her demeanor , and continued complaint of severe  lower pelvic pain, I suspected possible uterine scar dehiscence. Urgent csection called and pt agreed.    Procedure: REPEAT LOW TRANSVERSE C SECTION   Pt was taken to the operating room.  Fetal heart tones were noted to be in the 150s  Adequate epidural anesthesia was continmued by CRNA staff  She was then placed in left lateral tilt position. A hicks catheter was placed with iodine prep and the vaginal area was prepped with iodine also .  Sequential compression devices were applied to both legs.  Her abdomen was then prepped with Chloraprep and draped in standard sterile fashion.  A pre-procedure time out was performed.    After anesthesia was deemed adequate, a Pfannenstiel skin incision was made and was  carried down sharply to the fascia.  The fascia was then nicked in the midline and the incision was extended bilaterally  using the [place]ays scissors.  The fascia was tented up and dissected from the underlying rectus muscles using both sharp and blunt dissection.  The rectus muscles were then divided in the midline and the peritoneum was isolated and entered bluntly.  The peritoneal incision was extended superiorly and inferiorly taking care to avoid underlying viscera.  A bladder flap was created  and the bladder blade was placed.    A low transverse uterine incision was made with the scalpel and extended with bandage scissors.  The amniotic fluid was clear .  Baby was in occiput posterior position.   A hand was placed under the fetal vertex and  delivery of the infant was done with fundal pressure without difficulty.  The infant's cord was delayed clamped and cut and the  infant gave a good cry . Baby was handed off to awaiting personnel.  The placenta was then delivered intact using fundal massage.  The uterus was explored and emptied. It was determined to be intact with no extensions. The uterine incision was closed using a running interlocking stitch of 0-Vicryl  in a double layer.  Good hemostasis at the hysterotomy was then noted.  The fallopian tubes and ovaries bilaterally were grossly normal .  Hemostasis was again noted.  The gutters were cleared of all clots and debris.  The underside of the fascia and the rectus muscles were also found to be hemostatic.  The  peritoneum was closed with 2-0 vicryl using a ribbon retractor to protect the bowel. The fascia was then closed using a running stitch of 0-Vicryl .  The subcutaneous layer was then irrigated.   Hemostasis in the subcutaneous layer was assured .  The subcutaneous layer was reapproximated using a running stitch of 3-0 vicryl suture.  The skin was closed subcuticularly  with suture.   The patient was then cleaned and dried and a sterile dressing was  placed.    All sponge, needle and instrument counts were correct at the end of the procedure.  The patient tolerated the procedure well.  She was taken out of the operating room with her baby and back to her labor and delivery room in stable and satisfactory condition.      MD Tabitha Baez MD

## 2024-08-24 NOTE — ANESTHESIA PROCEDURE NOTES
Epidural Block    Patient location during procedure: OB  Start time: 8/23/2024 9:59 PM  End time: 8/23/2024 10:14 PM  Reason for block: labor analgesia  Staffing  Performed: CRNA   Authorized by: CHANNING Cardozo    Performed by: CHANNING Cardozo    Preanesthetic Checklist  Completed: patient identified, IV checked, risks and benefits discussed, surgical consent, pre-op evaluation, timeout performed and sterile techniques followed  Block Timeout  RN/Licensed healthcare professional reads aloud to the Anesthesia provider and entire team: Patient identity, procedure with side and site, patient position, and as applicable the availability of implants/special equipment/special requirements.  Patient on coagulant treatment: no  Timeout performed at: 8/23/2024 10:00 PM  Block Placement  Patient position: sitting  Prep: ChloraPrep  Sterility prep: mask, hand, gloves, drape and cap  Sedation level: no sedation  Patient monitoring: heart rate, continuous pulse oximetry and blood pressure  Approach: midline  Local numbing: lidocaine 1% to skin and subcutaneous tissues  Vertebral space: lumbar  Lumbar location: L3-L4  Epidural  Loss of resistance technique: saline  Guidance: landmark technique        Needle  Needle type: Tuohy   Needle gauge: 17  Needle length: 8.9cm  Needle insertion depth: 7 cm  Catheter type: multi-orifice  Catheter size: 20 G  Catheter at skin depth: 12 cm  Catheter securement method: clear occlusive dressing and surgical tape    Test dose: lidocaine 1.5% with epinephrine 1-to-200,000  Test dose: lidocaine 1.5% with epinephrine 1-to-200,000  Test dose result: no positive test dose    PCEA  Medication concentration used: 0.2% Ropivacaine with 2 mcg/mL Fentanyl  Dose (mL): 5  Lockout (minutes): 20  1-Hour Limit (boluses/hr): 3  Basal Rate: 7        Assessment  Sensory level: T6 bilateral  Block outcome: patient comfortable  Number of attempts: 1  Events: no positive test dose  Procedure  assessment: patient tolerated procedure well with no immediate complications

## 2024-08-24 NOTE — LACTATION NOTE
Lactation Consultant Note  Lactation Consultation    Recommendations/Summary  LC offered consult earlier in shift. FOB/Mother will call when desired.

## 2024-08-25 VITALS
DIASTOLIC BLOOD PRESSURE: 64 MMHG | HEART RATE: 84 BPM | BODY MASS INDEX: 31.91 KG/M2 | TEMPERATURE: 98 F | WEIGHT: 180.12 LBS | OXYGEN SATURATION: 96 % | SYSTOLIC BLOOD PRESSURE: 96 MMHG | HEIGHT: 63 IN | RESPIRATION RATE: 18 BRPM

## 2024-08-25 LAB
ERYTHROCYTE [DISTWIDTH] IN BLOOD BY AUTOMATED COUNT: 14.4 % (ref 11.5–14.5)
HCT VFR BLD AUTO: 28.3 % (ref 36–46)
HGB BLD-MCNC: 8.9 G/DL (ref 12–16)
MCH RBC QN AUTO: 27.6 PG (ref 26–34)
MCHC RBC AUTO-ENTMCNC: 31.4 G/DL (ref 32–36)
MCV RBC AUTO: 88 FL (ref 80–100)
NRBC BLD-RTO: 0 /100 WBCS (ref 0–0)
PLATELET # BLD AUTO: 142 X10*3/UL (ref 150–450)
RBC # BLD AUTO: 3.23 X10*6/UL (ref 4–5.2)
WBC # BLD AUTO: 12.7 X10*3/UL (ref 4.4–11.3)

## 2024-08-25 PROCEDURE — 2500000001 HC RX 250 WO HCPCS SELF ADMINISTERED DRUGS (ALT 637 FOR MEDICARE OP): Performed by: OBSTETRICS & GYNECOLOGY

## 2024-08-25 PROCEDURE — 85027 COMPLETE CBC AUTOMATED: CPT | Performed by: OBSTETRICS & GYNECOLOGY

## 2024-08-25 PROCEDURE — 36415 COLL VENOUS BLD VENIPUNCTURE: CPT | Performed by: OBSTETRICS & GYNECOLOGY

## 2024-08-25 RX ORDER — ACETAMINOPHEN 500 MG
1000 TABLET ORAL EVERY 6 HOURS PRN
COMMUNITY
Start: 2024-08-25 | End: 2024-08-30 | Stop reason: WASHOUT

## 2024-08-25 RX ORDER — IBUPROFEN 200 MG
600 TABLET ORAL EVERY 6 HOURS PRN
COMMUNITY
Start: 2024-08-25

## 2024-08-25 ASSESSMENT — PAIN SCALES - GENERAL: PAINLEVEL_OUTOF10: 3

## 2024-08-25 NOTE — DISCHARGE SUMMARY
Discharge Summary    Admission Date: 2024  Discharge Date: 2024     Discharge Diagnosis  Normal labor ([facility])    Hospital Course  Delivery Date: 2024 2:51 AM  Delivery type: , Low Transverse   GA at delivery: 38w3d  Outcome: Living  Anesthesia during delivery: Epidural  Intrapartum complications: None  Feeding method: Breastfeeding Status: Yes     Procedures:    Contraception at discharge: none      Pertinent Physical Exam At Time of Discharge  General: Examination reveals a well developed, well nourished, female, in no acute distress. She is alert and cooperative.  Incision: healing well.  Extremities: no redness or tenderness in the calves or thighs, no edema.  Psychological: awake and alert; oriented to person, place, and time.    Discharge Meds     Your medication list        START taking these medications        Instructions Last Dose Given Next Dose Due   acetaminophen 500 mg tablet  Commonly known as: Tylenol Extra Strength      Take 2 tablets (1,000 mg) by mouth every 6 hours if needed for mild pain (1 - 3).       ibuprofen 200 mg tablet  Commonly known as: Motrin IB      Take 3 tablets (600 mg) by mouth every 6 hours if needed for mild pain (1 - 3).              CONTINUE taking these medications        Instructions Last Dose Given Next Dose Due   alcohol swabs pads, medicated      Use 1, up to 5 times a day       DIALYVITE VITAMIN D ORAL           FreeStyle lancets 28 gauge      Use as instructed       isopropyl alcohoL 70 % towelette      Test as directed       PRENATAL VITAMIN ORAL                     Where to Get Your Medications        You can get these medications from any pharmacy    You don't need a prescription for these medications  acetaminophen 500 mg tablet  ibuprofen 200 mg tablet          Complications Requiring Follow-Up  Failed     Test Results Pending At Discharge  Pending Labs       No current pending labs.            Outpatient Follow-Up  No future  appointments.    I spent 10 minutes in the professional and overall care of this patient.      Cruz Angeles MD

## 2024-08-25 NOTE — CARE PLAN
The patient's goals for the shift include bond with baby    The clinical goals for the shift include bond with baby

## 2024-08-25 NOTE — CARE PLAN
The patient's goals for the shift include bond with baby    The clinical goals for the shift include stable vitals      Problem: Postpartum  Goal: Experiences normal postpartum course  Outcome: Progressing     Problem: Postpartum  Goal: Incisions, wounds, or drain sites healing without S/S of infection  Outcome: Progressing     Problem: Pain - Adult  Goal: Verbalizes/displays adequate comfort level or baseline comfort level  Outcome: Progressing

## 2024-08-25 NOTE — LACTATION NOTE
Lactation Consultant Note  Lactation Consultation  Reason for Consult: Follow-up assessment    Maternal Information  Has mother  before?: Yes  How long did the mother previously breastfeed?: 15months  Previous Maternal Breastfeeding Challenges: Infant separation, Difficult latch  Infant to breast within first 2 hours of birth?: Yes  Exclusive Pump and Bottle Feed: No  WIC Program: No    Maternal Assessment  Breast Assessment: Medium, Soft, Warm, Compressible  Nipple Assessment: Intact, Erect  Areola Assessment: Normal    Infant Assessment  Infant Behavior: Awake, Sucking, Content after feeding  Infant Assessment: Good lateral movement of tongue    Feeding Assessment  Nutrition Source: Breastmilk  Feeding Method: Nursing at the breast  Feeding Position: Laid back, Both sides, Cradle, Skin to skin  Suck/Feeding: Sustained, Baby led rhythmically, Audible swallowing, Content after feeding  Latch Assessment: Moderate assistance is needed, Bursts of sucking, swallowing, and rest, Comfortable latch, Wide open mouth < 160, Correct tongue position, Flanged lips, Comfortable with no pain    LATCH TOOL  Latch: Grasps breast, tongue down, lips flanged, rhythmic sucking  Audible Swallowing: Spontaneous and intermittent (24 hours old)  Type of Nipple: Everted (After stimulation)  Comfort (Breast/Nipple): Soft/non-tender  Hold (Positioning): Minimal assist, teach one side, mother does other, staff holds  LATCH Score: 9    Breast Pump  Pump: None    Other OB Lactation Tools       Patient Follow-up  Inpatient Lactation Follow-up Needed : Yes  Outpatient Lactation Follow-up: Recommended  Lactation Professional - OK to Discharge: Yes    Other OB Lactation Documentation  Infant Risk Factors: Early term birth 37-39 weeks    Recommendations/Summary  Experienced bfer.  Denies pain or problems assisted with positioning and her comfort.  Baby latched easily good suck swallow .. Encouraged mom to follow up with op johnnie david   Discussed milk supply and clogged ducts

## 2024-08-25 NOTE — LACTATION NOTE
This note was copied from a baby's chart.  Lactation Consultant Note  Lactation Consultation       Maternal Information       Maternal Assessment       Infant Assessment       Feeding Assessment       LATCH TOOL       Breast Pump       Other OB Lactation Tools       Patient Follow-up       Other OB Lactation Documentation       Recommendations/Summary  ***

## 2024-08-25 NOTE — PROGRESS NOTES
Talia Talavera is a 34 y.o., , who had a , Low Transverse delivery on 2024 at 38w3d and is now POD1.    She had Neuraxial Anesthesia without immediate complications noted.       Pain well controlled    Vitals:    24 0430   BP: 109/68   Pulse: 82   Resp: 18   Temp: 36.8 °C (98.2 °F)   SpO2: 98%       Neuraxial site assessed. No visible redness or swelling or drainage. Patient able to ambulate and move all extremities without difficulty. Able to void. No complaints of nausea/vomiting. Tolerating PO intake well. No s/sx of PDPH.     Anesthesia will sign off     KATELYNN Donahue-CRNA

## 2024-08-25 NOTE — LACTATION NOTE
"Lactation Consultant Note  Lactation Consultation  Reason for Consult: Initial assessment  Consultant Name: Debora Keenan RN,IBCLC    Maternal Information  Has mother  before?: Yes  How long did the mother previously breastfeed?: over a year  Previous Maternal Breastfeeding Challenges: Infant separation, Other (Comment) (NB has emergency surgery and was in the NICU, mother had grueling and \"heavy pumping schedule\" and ended up with mastitis)  Infant to breast within first 2 hours of birth?: Yes  Exclusive Pump and Bottle Feed: No  WIC Program: No    Maternal Assessment  Breast Assessment: Medium, Symmetrical, Compressible  Nipple Assessment: Intact, Erect  Areola Assessment: Normal    Infant Assessment  Infant Behavior: Sucking  Infant Assessment: Other (Comment) (NB latched at this time)    Feeding Assessment  Nutrition Source: Breastmilk  Feeding Method: Nursing at the breast  Feeding Position: Cradle, Switch nursing, Mother demonstrates good positioning  Suck/Feeding: Sustained, Baby led rhythmically, Coordinated suck/swallow/breathe  Latch Assessment: Latch achieved, Comfortable with no pain, Sucking and swallowing, Bursts of sucking, swallowing, and rest, Flanged lips, Nipple - slurping/pulls/nibbles (NB eager to latch, does slurp on when mothr switches sides)    LATCH TOOL  Latch: Grasps breast, tongue down, lips flanged, rhythmic sucking  Audible Swallowing: Spontaneous and intermittent (24 hours old)  Type of Nipple: Everted (After stimulation)  Comfort (Breast/Nipple): Soft/non-tender  Hold (Positioning): No assist from staff, mother able to position/hold infant  LATCH Score: 10    Breast Pump  Pump: Hand expression    Other OB Lactation Tools       Patient Follow-up  Inpatient Lactation Follow-up Needed : Yes  Outpatient Lactation Follow-up: Recommended    Other OB Lactation Documentation  Maternal Risk Factors: Age over 30, primiparity,  delivery, Other (comment) (EBL 1270,mother " has hs of anxiety)  Infant Risk Factors: Early term birth 37-39 weeks, Other (comment) (BW 3250, Fetal arrythmia in pregnancy)    Recommendations/Summary  Mother is 35yo   TOLAC resulted in Repeat  on 24 at 0251 of viable girl at 38.3 weeks gestation. Mother  her 2.4yo daughter for over a year, had some challenges in breastfeeding see above. Mother states she feels its going much better this time and FOB states feels its much more natural process this time. Mother has NB latched on left side and able to switch sides with ease. NB latch deeply and effectively with no pain and content after feeding.  Mother had few questions regarding normal nb feeding, use of haakaa pump and signs and symptoms of mastitis has she had with first baby. Reviewed and answered all questions to her satisfaction and encouraged to call LC at anytime for any needs she may have.

## 2024-08-27 ENCOUNTER — DOCUMENTATION (OUTPATIENT)
Dept: OBSTETRICS AND GYNECOLOGY | Facility: HOSPITAL | Age: 34
End: 2024-08-27
Payer: COMMERCIAL

## 2024-08-27 ENCOUNTER — TELEPHONE (OUTPATIENT)
Dept: LACTATION | Facility: CLINIC | Age: 34
End: 2024-08-27
Payer: COMMERCIAL

## 2024-08-27 LAB
BLOOD EXPIRATION DATE: NORMAL
DISPENSE STATUS: NORMAL
PRODUCT BLOOD TYPE: 6200
PRODUCT CODE: NORMAL
UNIT ABO: NORMAL
UNIT NUMBER: NORMAL
UNIT RH: NORMAL
UNIT VOLUME: 350
XM INTEP: NORMAL

## 2024-08-27 NOTE — PROGRESS NOTES
"Talia Talavera  1990 Repeat  on 24 at 38.3 weeks gestation delivered girl \"Bety Goodman\" BW 3250g. Mother is endorsing engorgement and is afraid she will get mastitis has she has historically with previous daughter ( Nova 22). Reviewed management of engorgement and s/s of mastitis and provided out patient LC follow up. Mother looking for telehealth or virtual support group ref to [facility] out patient LC services.       "

## 2024-08-27 NOTE — LACTATION NOTE
Follow up phone call for phone message. Milk is coming in and is getting engorged.  Discussed breast massage, engorgement care.  PI sheets sent.  Patient to call lactation tomorrow if engorgement is not improving and needs to be seen.

## 2024-08-29 ENCOUNTER — APPOINTMENT (OUTPATIENT)
Dept: OBSTETRICS AND GYNECOLOGY | Facility: CLINIC | Age: 34
End: 2024-08-29
Payer: COMMERCIAL

## 2024-08-30 ENCOUNTER — POSTPARTUM VISIT (OUTPATIENT)
Dept: OBSTETRICS AND GYNECOLOGY | Facility: CLINIC | Age: 34
End: 2024-08-30
Payer: COMMERCIAL

## 2024-08-30 VITALS
SYSTOLIC BLOOD PRESSURE: 120 MMHG | WEIGHT: 173 LBS | BODY MASS INDEX: 29.53 KG/M2 | DIASTOLIC BLOOD PRESSURE: 78 MMHG | HEIGHT: 64 IN

## 2024-08-30 ASSESSMENT — EDINBURGH POSTNATAL DEPRESSION SCALE (EPDS)
I HAVE FELT SAD OR MISERABLE: NOT VERY OFTEN
TOTAL SCORE: 8
THE THOUGHT OF HARMING MYSELF HAS OCCURRED TO ME: NEVER
I HAVE LOOKED FORWARD WITH ENJOYMENT TO THINGS: AS MUCH AS I EVER DID
I HAVE BLAMED MYSELF UNNECESSARILY WHEN THINGS WENT WRONG: YES, SOME OF THE TIME
I HAVE BEEN SO UNHAPPY THAT I HAVE HAD DIFFICULTY SLEEPING: NOT AT ALL
THINGS HAVE BEEN GETTING ON TOP OF ME: YES, SOMETIMES I HAVEN'T BEEN COPING AS WELL AS USUAL
I HAVE BEEN SO UNHAPPY THAT I HAVE BEEN CRYING: ONLY OCCASIONALLY
I HAVE BEEN ANXIOUS OR WORRIED FOR NO GOOD REASON: HARDLY EVER
I HAVE FELT SCARED OR PANICKY FOR NO GOOD REASON: NO, NOT MUCH
I HAVE BEEN ABLE TO LAUGH AND SEE THE FUNNY SIDE OF THINGS: AS MUCH AS I ALWAYS COULD

## 2024-08-30 NOTE — PROGRESS NOTES
Patient presents today for her 1 week PPV     Delivered on 24  section. Delivered by MB. Baby girl, 7lb 2.6oz  On meds: Prenatal, Vitamin D, Motrin PRN  C/O: Pain on R side of incision   Subjective:  Talia Talavera is a 34 y.o.  now POD#6 from  section for failed TOLAC presenting for post-operative incision check. On 24, patient underwent repeat LTCS without complication.     She is overall doing well at home. She is taking ibuprofen for pain. She is eating, drinking, voiding, and having bowel movements.  Lochia normal. Baby is breastfeeding feeding and doing well. Mood some anxious moments but overall ok.     Objective:  Vitals:    24 1613   BP: 120/78     Gen: NAD  Abd: soft, NTND  Skin: clean, dry, and intact; no erythema, discharge, or bleeding  Neuro: alert and oriented  Psych: appropriate affect    Assessment and Plan:  Talia Talavera is a  now POD#6 from repeat LT  section presenting for post-operative wound check, doing well.    Problem List Items Addressed This Visit    None      Post-Op  -Meeting milestones  -Incision well-approximated without si/sx of infection  -Continue pelvic rest and lifting precautions    Postpartum  -Feeding: breast  -Contraception: vasectomy  -Mood: EPDS 8 - feels overall well declines therapy/meds    RTC in 4 weeks for postpartum visit    Alesia Estrada MD

## 2024-10-04 ENCOUNTER — APPOINTMENT (OUTPATIENT)
Dept: OBSTETRICS AND GYNECOLOGY | Facility: CLINIC | Age: 34
End: 2024-10-04
Payer: COMMERCIAL

## 2024-10-04 VITALS
WEIGHT: 161 LBS | BODY MASS INDEX: 28.53 KG/M2 | SYSTOLIC BLOOD PRESSURE: 121 MMHG | DIASTOLIC BLOOD PRESSURE: 78 MMHG | HEIGHT: 63 IN

## 2024-10-04 ASSESSMENT — EDINBURGH POSTNATAL DEPRESSION SCALE (EPDS)
I HAVE BEEN ABLE TO LAUGH AND SEE THE FUNNY SIDE OF THINGS: AS MUCH AS I ALWAYS COULD
THINGS HAVE BEEN GETTING ON TOP OF ME: NO, MOST OF THE TIME I HAVE COPED QUITE WELL
THE THOUGHT OF HARMING MYSELF HAS OCCURRED TO ME: NEVER
I HAVE BLAMED MYSELF UNNECESSARILY WHEN THINGS WENT WRONG: YES, SOME OF THE TIME
I HAVE FELT SAD OR MISERABLE: NOT VERY OFTEN
I HAVE FELT SCARED OR PANICKY FOR NO GOOD REASON: YES, SOMETIMES
I HAVE LOOKED FORWARD WITH ENJOYMENT TO THINGS: RATHER LESS THAN I USED TO
TOTAL SCORE: 10
I HAVE BEEN SO UNHAPPY THAT I HAVE HAD DIFFICULTY SLEEPING: NOT AT ALL
I HAVE BEEN ANXIOUS OR WORRIED FOR NO GOOD REASON: YES, SOMETIMES
I HAVE BEEN SO UNHAPPY THAT I HAVE BEEN CRYING: ONLY OCCASIONALLY

## 2024-10-04 NOTE — PROGRESS NOTES
Last pap: 23, Neg. HPV -     Patient presents for her 6 week Postpartum Visit.  Delivered   EPDS = 10  Pap: 2023, Neg. HPV -  LMP: 2023  Breastfeeding: Yes  Birth Control: No  C/O: Hormone concerns     Subjective:  Talia Talavera is a female 34 y.o. year old  who delivered at 38w3d presenting for 6 week postpartum visit. On , patient underwent repeat LTCS for failed TOLAC without complication.     Overall, she feels well. She is able to perform daily tasks without difficulty. Lochia ceased . She has not had a period since that time. Declines for contraception. She feels well-supported at home. She is currently breast feeding and baby is doing well. Mood sometimes feels anxious but overall well not interested in therapy or meds.  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    Objective:  Vitals:    10/04/24 0925   BP: 121/78     Gen: awake, alert  Head: NCAT  HEENT: moist mucus membranes  Pulm: breathing comfortably on room air  Breasts symmetric no masses, nipples intact  CV: warm and well-perfused  Abdomen soft ND NT, incision healed  Neuro: alert and oriented  Psych: appropriate affect     Assessment and Plan:  Talia Talavera is a female 34 y.o. year old  who delivered at 38w3d by repeat LTCS presenting for 6 week postpartum visit, doing well.    Problem List Items Addressed This Visit       Encounter for postpartum visit - Primary    Current Assessment & Plan      incision well healed, discussed no restrictions but go slow with exercise  Reviewed MED, no treatment but validated thought related to hormones  Declines contraception            RTC in 1 year for annual exam or sooner as needed    Alesia Estrada MD

## 2024-10-05 PROBLEM — O36.8390 FETAL ARRHYTHMIA AFFECTING PREGNANCY, ANTEPARTUM (HHS-HCC): Status: RESOLVED | Noted: 2024-04-09 | Resolved: 2024-10-05

## 2024-10-05 PROBLEM — Z37.9 NORMAL LABOR (HHS-HCC): Status: RESOLVED | Noted: 2024-08-23 | Resolved: 2024-10-05

## 2024-10-05 PROBLEM — Z98.891 HISTORY OF PRIMARY CESAREAN SECTION: Status: RESOLVED | Noted: 2024-02-07 | Resolved: 2024-10-05

## 2024-10-05 PROBLEM — Z34.81 NORMAL PREGNANCY IN MULTIGRAVIDA IN FIRST TRIMESTER (HHS-HCC): Status: RESOLVED | Noted: 2024-02-07 | Resolved: 2024-10-05

## 2024-10-05 NOTE — ASSESSMENT & PLAN NOTE
incision well healed, discussed no restrictions but go slow with exercise  Reviewed MED, no treatment but validated thought related to hormones  Declines contraception

## (undated) DEVICE — Device